# Patient Record
Sex: FEMALE | Race: BLACK OR AFRICAN AMERICAN | NOT HISPANIC OR LATINO | URBAN - METROPOLITAN AREA
[De-identification: names, ages, dates, MRNs, and addresses within clinical notes are randomized per-mention and may not be internally consistent; named-entity substitution may affect disease eponyms.]

---

## 2018-02-09 ENCOUNTER — OUTPATIENT (OUTPATIENT)
Dept: OUTPATIENT SERVICES | Facility: HOSPITAL | Age: 53
LOS: 1 days | Discharge: HOME | End: 2018-02-09

## 2018-02-12 DIAGNOSIS — G47.33 OBSTRUCTIVE SLEEP APNEA (ADULT) (PEDIATRIC): ICD-10-CM

## 2019-10-25 ENCOUNTER — INPATIENT (INPATIENT)
Facility: HOSPITAL | Age: 54
LOS: 0 days | Discharge: HOME | End: 2019-10-26
Attending: INTERNAL MEDICINE | Admitting: INTERNAL MEDICINE
Payer: COMMERCIAL

## 2019-10-25 VITALS
DIASTOLIC BLOOD PRESSURE: 76 MMHG | RESPIRATION RATE: 20 BRPM | HEART RATE: 112 BPM | TEMPERATURE: 96 F | SYSTOLIC BLOOD PRESSURE: 132 MMHG | OXYGEN SATURATION: 100 %

## 2019-10-25 LAB
ALBUMIN SERPL ELPH-MCNC: 3.8 G/DL — SIGNIFICANT CHANGE UP (ref 3.5–5.2)
ALP SERPL-CCNC: 95 U/L — SIGNIFICANT CHANGE UP (ref 30–115)
ALT FLD-CCNC: 26 U/L — SIGNIFICANT CHANGE UP (ref 0–41)
ANION GAP SERPL CALC-SCNC: 16 MMOL/L — HIGH (ref 7–14)
AST SERPL-CCNC: 33 U/L — SIGNIFICANT CHANGE UP (ref 0–41)
BASOPHILS # BLD AUTO: 0.01 K/UL — SIGNIFICANT CHANGE UP (ref 0–0.2)
BASOPHILS NFR BLD AUTO: 0.1 % — SIGNIFICANT CHANGE UP (ref 0–1)
BILIRUB SERPL-MCNC: 0.2 MG/DL — SIGNIFICANT CHANGE UP (ref 0.2–1.2)
BUN SERPL-MCNC: 20 MG/DL — SIGNIFICANT CHANGE UP (ref 10–20)
CALCIUM SERPL-MCNC: 9.1 MG/DL — SIGNIFICANT CHANGE UP (ref 8.5–10.1)
CHLORIDE SERPL-SCNC: 103 MMOL/L — SIGNIFICANT CHANGE UP (ref 98–110)
CO2 SERPL-SCNC: 20 MMOL/L — SIGNIFICANT CHANGE UP (ref 17–32)
CREAT SERPL-MCNC: 0.8 MG/DL — SIGNIFICANT CHANGE UP (ref 0.7–1.5)
EOSINOPHIL # BLD AUTO: 0.05 K/UL — SIGNIFICANT CHANGE UP (ref 0–0.7)
EOSINOPHIL NFR BLD AUTO: 0.7 % — SIGNIFICANT CHANGE UP (ref 0–8)
GLUCOSE SERPL-MCNC: 176 MG/DL — HIGH (ref 70–99)
HCT VFR BLD CALC: 44.1 % — SIGNIFICANT CHANGE UP (ref 37–47)
HGB BLD-MCNC: 14.1 G/DL — SIGNIFICANT CHANGE UP (ref 12–16)
IMM GRANULOCYTES NFR BLD AUTO: 0.7 % — HIGH (ref 0.1–0.3)
LYMPHOCYTES # BLD AUTO: 2.26 K/UL — SIGNIFICANT CHANGE UP (ref 1.2–3.4)
LYMPHOCYTES # BLD AUTO: 31 % — SIGNIFICANT CHANGE UP (ref 20.5–51.1)
MCHC RBC-ENTMCNC: 29.4 PG — SIGNIFICANT CHANGE UP (ref 27–31)
MCHC RBC-ENTMCNC: 32 G/DL — SIGNIFICANT CHANGE UP (ref 32–37)
MCV RBC AUTO: 92.1 FL — SIGNIFICANT CHANGE UP (ref 81–99)
MONOCYTES # BLD AUTO: 0.25 K/UL — SIGNIFICANT CHANGE UP (ref 0.1–0.6)
MONOCYTES NFR BLD AUTO: 3.4 % — SIGNIFICANT CHANGE UP (ref 1.7–9.3)
NEUTROPHILS # BLD AUTO: 4.66 K/UL — SIGNIFICANT CHANGE UP (ref 1.4–6.5)
NEUTROPHILS NFR BLD AUTO: 64.1 % — SIGNIFICANT CHANGE UP (ref 42.2–75.2)
NRBC # BLD: 0 /100 WBCS — SIGNIFICANT CHANGE UP (ref 0–0)
PLATELET # BLD AUTO: 298 K/UL — SIGNIFICANT CHANGE UP (ref 130–400)
POTASSIUM SERPL-MCNC: 5.5 MMOL/L — HIGH (ref 3.5–5)
POTASSIUM SERPL-SCNC: 5.5 MMOL/L — HIGH (ref 3.5–5)
PROT SERPL-MCNC: 6.7 G/DL — SIGNIFICANT CHANGE UP (ref 6–8)
RBC # BLD: 4.79 M/UL — SIGNIFICANT CHANGE UP (ref 4.2–5.4)
RBC # FLD: 14.5 % — SIGNIFICANT CHANGE UP (ref 11.5–14.5)
SODIUM SERPL-SCNC: 139 MMOL/L — SIGNIFICANT CHANGE UP (ref 135–146)
WBC # BLD: 7.28 K/UL — SIGNIFICANT CHANGE UP (ref 4.8–10.8)
WBC # FLD AUTO: 7.28 K/UL — SIGNIFICANT CHANGE UP (ref 4.8–10.8)

## 2019-10-25 RX ORDER — ALBUTEROL 90 UG/1
2 AEROSOL, METERED ORAL EVERY 6 HOURS
Refills: 0 | Status: DISCONTINUED | OUTPATIENT
Start: 2019-10-25 | End: 2019-10-26

## 2019-10-25 RX ORDER — FAMOTIDINE 10 MG/ML
20 INJECTION INTRAVENOUS ONCE
Refills: 0 | Status: COMPLETED | OUTPATIENT
Start: 2019-10-25 | End: 2019-10-25

## 2019-10-25 RX ORDER — FAMOTIDINE 10 MG/ML
20 INJECTION INTRAVENOUS
Refills: 0 | Status: DISCONTINUED | OUTPATIENT
Start: 2019-10-25 | End: 2019-10-26

## 2019-10-25 RX ORDER — DIPHENHYDRAMINE HCL 50 MG
50 CAPSULE ORAL ONCE
Refills: 0 | Status: COMPLETED | OUTPATIENT
Start: 2019-10-25 | End: 2019-10-25

## 2019-10-25 RX ORDER — DIPHENHYDRAMINE HCL 50 MG
25 CAPSULE ORAL EVERY 6 HOURS
Refills: 0 | Status: DISCONTINUED | OUTPATIENT
Start: 2019-10-25 | End: 2019-10-26

## 2019-10-25 RX ORDER — SODIUM CHLORIDE 9 MG/ML
1000 INJECTION, SOLUTION INTRAVENOUS ONCE
Refills: 0 | Status: COMPLETED | OUTPATIENT
Start: 2019-10-25 | End: 2019-10-25

## 2019-10-25 RX ORDER — ALPRAZOLAM 0.25 MG
0.5 TABLET ORAL ONCE
Refills: 0 | Status: DISCONTINUED | OUTPATIENT
Start: 2019-10-25 | End: 2019-10-26

## 2019-10-25 RX ORDER — EPINEPHRINE 0.3 MG/.3ML
0.3 INJECTION INTRAMUSCULAR; SUBCUTANEOUS ONCE
Refills: 0 | Status: DISCONTINUED | OUTPATIENT
Start: 2019-10-25 | End: 2019-10-26

## 2019-10-25 RX ORDER — EPINEPHRINE 0.3 MG/.3ML
0.3 INJECTION INTRAMUSCULAR; SUBCUTANEOUS ONCE
Refills: 0 | Status: COMPLETED | OUTPATIENT
Start: 2019-10-25 | End: 2019-10-25

## 2019-10-25 RX ORDER — BUDESONIDE AND FORMOTEROL FUMARATE DIHYDRATE 160; 4.5 UG/1; UG/1
2 AEROSOL RESPIRATORY (INHALATION)
Refills: 0 | Status: DISCONTINUED | OUTPATIENT
Start: 2019-10-25 | End: 2019-10-26

## 2019-10-25 RX ORDER — DEXAMETHASONE 0.5 MG/5ML
6 ELIXIR ORAL EVERY 8 HOURS
Refills: 0 | Status: COMPLETED | OUTPATIENT
Start: 2019-10-25 | End: 2019-10-26

## 2019-10-25 RX ADMIN — EPINEPHRINE 0.3 MILLIGRAM(S): 0.3 INJECTION INTRAMUSCULAR; SUBCUTANEOUS at 18:05

## 2019-10-25 RX ADMIN — Medication 50 MILLIGRAM(S): at 18:04

## 2019-10-25 RX ADMIN — SODIUM CHLORIDE 1000 MILLILITER(S): 9 INJECTION, SOLUTION INTRAVENOUS at 18:05

## 2019-10-25 RX ADMIN — Medication 125 MILLIGRAM(S): at 18:05

## 2019-10-25 RX ADMIN — SODIUM CHLORIDE 1000 MILLILITER(S): 9 INJECTION, SOLUTION INTRAVENOUS at 19:00

## 2019-10-25 RX ADMIN — FAMOTIDINE 20 MILLIGRAM(S): 10 INJECTION INTRAVENOUS at 18:05

## 2019-10-25 NOTE — ED ADULT NURSE NOTE - OBJECTIVE STATEMENT
patient states she ate a candy bar with peanuts about 1330. about 1530 patient started to feel chest pain, sob, nausea/vomiting, and her neck started swelling. she gave herself her epipen and came to ED. as per , patient's neck swelling has improved

## 2019-10-25 NOTE — H&P ADULT - NSHPPHYSICALEXAM_GEN_ALL_CORE
PHYSICAL EXAM:  GENERAL: No acute distress, well-developed  HEAD:  Atraumatic, Normocephalic  EYES: EOMI, PERRLA, conjunctiva and sclera clear  NECK: Supple, no lymphadenopathy, no JVD  CHEST/LUNG: CTAB; No wheezes, rales, or rhonchi  HEART: Regular rate and rhythm; No murmurs, rubs, or gallops  ABDOMEN: Soft, non-tender, non-distended; normal bowel sounds, no organomegaly  EXTREMITIES:  2+ peripheral pulses b/l, No clubbing, cyanosis, or edema  NEUROLOGY: A&O x 3, no focal deficits  SKIN: No rashes or lesions

## 2019-10-25 NOTE — ED PROVIDER NOTE - PHYSICAL EXAMINATION
Constitutional: Well developed, well nourished. NAD. Good general hygiene.  Head: Atraumatic.  Eyes: PERRLA. EOMI without discomfort.   ENT: No nasal discharge. Mucous membranes moist. No tonsillar erythema, edema. No edema of tongue or lips.  Neck: Supple. Painless ROM.  Cardiovascular: Sinus tachy. Normal S1 and S2. No murmurs. 2+ pulses in all extremities.   Pulmonary: Normal respiratory rate and effort. Lungs clear to auscultation bilaterally. No wheezing, rales, or rhonchi. Bilateral, equal lung expansion.   Abdominal: Soft. Nondistended. Nontender. No rebound or guarding.   Extremities. Pelvis stable. No lower extremity edema. Symmetric calves.  Skin: No rashes.   Neuro: AAOx3. No focal neurological deficits.

## 2019-10-25 NOTE — ED ADULT NURSE NOTE - NSIMPLEMENTINTERV_GEN_ALL_ED
Implemented All Universal Safety Interventions:  [place] to call system. Call bell, personal items and telephone within reach. Instruct patient to call for assistance. Room bathroom lighting operational. Non-slip footwear when patient is off stretcher. Physically safe environment: no spills, clutter or unnecessary equipment. Stretcher in lowest position, wheels locked, appropriate side rails in place.

## 2019-10-25 NOTE — H&P ADULT - NSHPLABSRESULTS_GEN_ALL_CORE
14.1   7.28  )-----------( 298      ( 25 Oct 2019 17:30 )             44.1       10-25    139  |  103  |  20  ----------------------------<  176<H>  5.5<H>   |  20  |  0.8    Ca    9.1      25 Oct 2019 17:30    TPro  6.7  /  Alb  3.8  /  TBili  0.2  /  DBili  x   /  AST  33  /  ALT  26  /  AlkPhos  95  10-25      LIVER FUNCTIONS - ( 25 Oct 2019 17:30 )  Alb: 3.8 g/dL / Pro: 6.7 g/dL / ALK PHOS: 95 U/L / ALT: 26 U/L / AST: 33 U/L / GGT: x

## 2019-10-25 NOTE — ED PROVIDER NOTE - ATTENDING CONTRIBUTION TO CARE
54 year old female pmhx of allergies to peanuts, come sin s/p peanut exposure, + sensation of throat closure, + hives, no cp, + SOB, no n/v/d. patient given epi prior to arrival, patient required second epi in the ED    CONSTITUTIONAL: Well-developed; well-nourished; in no acute distress. Sitting up and providing appropriate history and physical examination  SKIN: skin exam is warm and dry, no acute rash.  HEAD: Normocephalic; atraumatic.  EYES: PERRL, 3 mm bilateral, no nystagmus, EOM intact; conjunctiva and sclera clear.  ENT: No uvular edema, no pooling of secretions, No nasal discharge; airway clear.  NECK: Supple; non tender. + full passive ROM in all directions. No JVD  CARD: S1, S2 normal; no murmurs, gallops, or rubs. Regular rate and rhythm. + Symmetric Strong Pulses  RESP: No wheezes, rales or rhonchi. Good air movement bilaterally  ABD: soft; non-distended; non-tender. No Rebound, No Guarding, No signs of peritonitis, No CVA tenderness. No pulsatile abdominal mass. + Strong and Symmetric Pulses  EXT: Normal ROM. No clubbing, cyanosis or edema. Dp and Pt Pulses intact. Cap refill less than 3 seconds  NEURO: Alert, oriented, grossly unremarkable. No Focal deficits. GCS 15. NIH 0  PSYCH: Cooperative, appropriate.

## 2019-10-25 NOTE — CONSULT NOTE ADULT - ASSESSMENT
53 y/o female with anaphlyactic reaction to peanuts. - S/p administration of epi, steroids and benadryl - stable  - Cont O2 monitoring  - HOB elevated  - PO trial of liquids  - Cont steroids x24 hrs  - Recall ENT prn

## 2019-10-25 NOTE — ED ADULT TRIAGE NOTE - CHIEF COMPLAINT QUOTE
pt biba for peanut allergic rxn after having a candy bar at 13:30, had a rxn at 15:30. pt c/o diff breathing , chest pain. pt gave herself the epipen.  pt still with c/o dioff breathing. ( hx of asthma)

## 2019-10-25 NOTE — CONSULT NOTE ADULT - SUBJECTIVE AND OBJECTIVE BOX
ENT DAILY PROGRESS NOTE    Overnight events/Interval HPI: HPI:  53 y/o female with known hx of peanut allergy presented to ED with SOB/difficulty breathing and pruritus following accidental ingestion of peanuts. Pt reports incident occurred at 13:30 and she initially was ok but developed pruritus an hour later. No action was taken until around #;30pm where she started noticing throat itchiness and odynophagia. Reports sensation of throat closing up and that is when she self administered IM epi. In ED she was administered epi, steroids and benadryl Patient appears comfortable, without difficulty breathing/SOB, only c/o scratchy sensation in throat.     FFL: No airway edema, mild fullness to lingual tonsils, airway open, VC intact and moving well b/l, no VC edema/erythema.          Allergies    Drug Allergies Not Recorded  peanuts (Vomiting; Faint; Nausea; Urticaria; Short breath; Headache)    Intolerances        MEDICATIONS:  Antiinfectives:     IV fluids:    Hematologic/Anticoagulation:    Pain medications/Neuro:    Endocrine Medications:     All other standing medications:     All other PRN medications:      Vital Signs Last 24 Hrs  T(C): 37 (25 Oct 2019 17:45), Max: 37 (25 Oct 2019 17:45)  T(F): 98.6 (25 Oct 2019 17:45), Max: 98.6 (25 Oct 2019 17:45)  HR: 97 (25 Oct 2019 17:45) (97 - 112)  BP: 140/87 (25 Oct 2019 17:45) (132/76 - 140/87)  BP(mean): --  RR: 18 (25 Oct 2019 17:45) (18 - 20)  SpO2: 97% (25 Oct 2019 17:45) (97% - 100%)          PHYSICAL EXAM:    ENT EXAM-   Constitutional: Well-developed, well-nourished.  No hoarseness.     Head:  normocephalic, atraumatic.   OC/OP:  Floor of mouth, buccal mucosa, lips, hard palate, soft palate, uvula, posterior pharyngeal wall normal.  Mucosa moist.  Neck:  Trachea midline.  Thyroid, parotid and submandibular glands normal.  Lymph:  No cervical adenopathy.      LABS:  CBC-                        14.1   7.28  )-----------( 298      ( 25 Oct 2019 17:30 )             44.1     BMP/CMP-        Coagulation Studies-    Endocrine Panel-

## 2019-10-25 NOTE — H&P ADULT - ASSESSMENT
55 y/o female with hx of DM2 admitted for anaphylactic reaction to peanuts. - S/p administration of epi, steroids and benadryl - admitted for observation,  now complaining of throat scratchy sensation.   FFL showed FFL: No airway edema, mild fullness to lingual tonsils, airway open, VC intact and moving well b/l, no VC edema/erythema.    - O2 monitoring   - HOB elevated  - PO trial of liquids  - Cont steroids x24 hrs  - Continue benadryl x24 hrs   - Continue famotidine x 24 hrs  - Epi as needed     Diabetes mellitus type 2 55 y/o female with hx of DM2 admitted for anaphylactic reaction to peanuts. - S/p administration of epi, steroids and benadryl - admitted for observation,  now complaining of throat scratchy sensation.   FFL showed FFL: No airway edema, mild fullness to lingual tonsils, airway open, VC intact and moving well b/l, no VC edema/erythema.    Anaphylaxis to peanuts   - O2 monitoring   - HOB elevated  - PO trial of liquids  - Cont steroids x24 hrs  - Continue benadryl x24 hrs   - Continue famotidine x 24 hrs  - Epi as needed     Prediabetes   fu am sugar   start insulin if >180    sarcoidosis   outpatient mngt     asthma resume home meds     from home

## 2019-10-25 NOTE — ED PROVIDER NOTE - CLINICAL SUMMARY MEDICAL DECISION MAKING FREE TEXT BOX
I personally evaluated the patient. I reviewed the Resident’s or Physician Assistant’s note (as assigned above), and agree with the findings and plan except as documented in my note. patient evaluated for anaphylaxis, patient admitted for observation, required second epinephrine in the ED for refractory anaphylaxis, much improvement

## 2019-10-25 NOTE — H&P ADULT - HISTORY OF PRESENT ILLNESS
53 y/o female with known hx of DM and of peanut allergy presented to ED with SOB/difficulty breathing and pruritus following accidental ingestion of peanuts. Pt reports incident occurred at 13:30 and she initially was ok but developed pruritus an hour later. Patient then she started noticing throat itchiness and odynophagia. She Reported sensation of throat closing up and that is when she self administered IM epi. In ED she was administered epi, steroids and benadryl   then symptoms resolved except for scratchy sensation in throat - ENT evaluated patient and did   FFL: No airway edema, mild fullness to lingual tonsils, airway open, VC intact and moving well b/l, no VC edema/erythema.  being admitted for observation 53 y/o female with known hx of pre-DM , asthma , sarcoidosis, anxiety and of peanut allergy presented to ED with SOB/difficulty breathing and pruritus following accidental ingestion of peanuts. Pt reports incident occurred at 13:30 and she initially was ok but developed pruritus an hour later. Patient then she started noticing throat itchiness and odynophagia. She Reported sensation of throat closing up and that is when she self administered IM epi. In ED she was administered epi, steroids and benadryl   then symptoms resolved except for scratchy sensation in throat - ENT evaluated patient and did   FFL: No airway edema, mild fullness to lingual tonsils, airway open, VC intact and moving well b/l, no VC edema/erythema.  being admitted for observation

## 2019-10-25 NOTE — ED PROVIDER NOTE - PROGRESS NOTE DETAILS
Scoped by ENT, no edema of cords. Pt still has sore throat , otherwise pt improved after 1 epi and steroids, benadryl, pepcid, fluids. Labs normal.

## 2019-10-25 NOTE — ED PROVIDER NOTE - NS ED ROS FT
General: No fever, chills, or weakness.  Eyes:  No visual changes, eye pain or discharge.  ENMT:  Throat itching. No nose pain.   Cardiac:  No chest pain, SOB or edema. No chest pain with exertion.  Respiratory:  SOB. No cough.   GI:  No nausea, vomiting, diarrhea or abdominal pain.  :  No dysuria, frequency or burning.  MS:  No myalgia, muscle weakness, joint pain or back pain.  Neuro:  No headache.  No LOC. No change in ambulation. No dizziness.  Skin:  No skin rash.

## 2019-10-26 ENCOUNTER — TRANSCRIPTION ENCOUNTER (OUTPATIENT)
Age: 54
End: 2019-10-26

## 2019-10-26 VITALS
RESPIRATION RATE: 18 BRPM | HEART RATE: 98 BPM | TEMPERATURE: 97 F | SYSTOLIC BLOOD PRESSURE: 135 MMHG | DIASTOLIC BLOOD PRESSURE: 66 MMHG

## 2019-10-26 LAB
ANION GAP SERPL CALC-SCNC: 13 MMOL/L — SIGNIFICANT CHANGE UP (ref 7–14)
BUN SERPL-MCNC: 18 MG/DL — SIGNIFICANT CHANGE UP (ref 10–20)
CALCIUM SERPL-MCNC: 9.3 MG/DL — SIGNIFICANT CHANGE UP (ref 8.5–10.1)
CHLORIDE SERPL-SCNC: 102 MMOL/L — SIGNIFICANT CHANGE UP (ref 98–110)
CO2 SERPL-SCNC: 23 MMOL/L — SIGNIFICANT CHANGE UP (ref 17–32)
CREAT SERPL-MCNC: 0.7 MG/DL — SIGNIFICANT CHANGE UP (ref 0.7–1.5)
GLUCOSE SERPL-MCNC: 181 MG/DL — HIGH (ref 70–99)
HCT VFR BLD CALC: 40.1 % — SIGNIFICANT CHANGE UP (ref 37–47)
HCV AB S/CO SERPL IA: 0.08 S/CO — SIGNIFICANT CHANGE UP (ref 0–0.99)
HCV AB SERPL-IMP: SIGNIFICANT CHANGE UP
HGB BLD-MCNC: 12.7 G/DL — SIGNIFICANT CHANGE UP (ref 12–16)
MAGNESIUM SERPL-MCNC: 2 MG/DL — SIGNIFICANT CHANGE UP (ref 1.8–2.4)
MCHC RBC-ENTMCNC: 29.1 PG — SIGNIFICANT CHANGE UP (ref 27–31)
MCHC RBC-ENTMCNC: 31.7 G/DL — LOW (ref 32–37)
MCV RBC AUTO: 91.8 FL — SIGNIFICANT CHANGE UP (ref 81–99)
NRBC # BLD: 0 /100 WBCS — SIGNIFICANT CHANGE UP (ref 0–0)
PLATELET # BLD AUTO: 266 K/UL — SIGNIFICANT CHANGE UP (ref 130–400)
POTASSIUM SERPL-MCNC: 4.2 MMOL/L — SIGNIFICANT CHANGE UP (ref 3.5–5)
POTASSIUM SERPL-SCNC: 4.2 MMOL/L — SIGNIFICANT CHANGE UP (ref 3.5–5)
RBC # BLD: 4.37 M/UL — SIGNIFICANT CHANGE UP (ref 4.2–5.4)
RBC # FLD: 14.6 % — HIGH (ref 11.5–14.5)
SODIUM SERPL-SCNC: 138 MMOL/L — SIGNIFICANT CHANGE UP (ref 135–146)
WBC # BLD: 19.51 K/UL — HIGH (ref 4.8–10.8)
WBC # FLD AUTO: 19.51 K/UL — HIGH (ref 4.8–10.8)

## 2019-10-26 RX ORDER — INFLUENZA VIRUS VACCINE 15; 15; 15; 15 UG/.5ML; UG/.5ML; UG/.5ML; UG/.5ML
0.5 SUSPENSION INTRAMUSCULAR ONCE
Refills: 0 | Status: DISCONTINUED | OUTPATIENT
Start: 2019-10-26 | End: 2019-10-26

## 2019-10-26 RX ORDER — ESCITALOPRAM OXALATE 10 MG/1
1 TABLET, FILM COATED ORAL
Qty: 0 | Refills: 0 | DISCHARGE

## 2019-10-26 RX ORDER — EPINEPHRINE 0.3 MG/.3ML
0.3 INJECTION INTRAMUSCULAR; SUBCUTANEOUS
Qty: 0.3 | Refills: 0
Start: 2019-10-26 | End: 2019-10-26

## 2019-10-26 RX ORDER — EPINEPHRINE 0.3 MG/.3ML
0.3 INJECTION INTRAMUSCULAR; SUBCUTANEOUS
Qty: 0 | Refills: 0 | DISCHARGE
Start: 2019-10-26

## 2019-10-26 RX ADMIN — Medication 6 MILLIGRAM(S): at 06:22

## 2019-10-26 RX ADMIN — Medication 0.5 MILLIGRAM(S): at 00:16

## 2019-10-26 RX ADMIN — ALBUTEROL 2 PUFF(S): 90 AEROSOL, METERED ORAL at 09:15

## 2019-10-26 RX ADMIN — Medication 6 MILLIGRAM(S): at 00:03

## 2019-10-26 RX ADMIN — ALBUTEROL 2 PUFF(S): 90 AEROSOL, METERED ORAL at 13:50

## 2019-10-26 RX ADMIN — Medication 25 MILLIGRAM(S): at 12:25

## 2019-10-26 RX ADMIN — FAMOTIDINE 104 MILLIGRAM(S): 10 INJECTION INTRAVENOUS at 06:23

## 2019-10-26 RX ADMIN — Medication 25 MILLIGRAM(S): at 00:04

## 2019-10-26 RX ADMIN — BUDESONIDE AND FORMOTEROL FUMARATE DIHYDRATE 2 PUFF(S): 160; 4.5 AEROSOL RESPIRATORY (INHALATION) at 08:18

## 2019-10-26 RX ADMIN — FAMOTIDINE 104 MILLIGRAM(S): 10 INJECTION INTRAVENOUS at 00:03

## 2019-10-26 NOTE — DISCHARGE NOTE NURSING/CASE MANAGEMENT/SOCIAL WORK - PATIENT PORTAL LINK FT
You can access the FollowMyHealth Patient Portal offered by [facility] by registering at the following website: [email or web address]. By joining [facility]’s FollowMyHealth portal, you will also be able to view your health information using other applications (apps) compatible with our system.

## 2019-10-26 NOTE — DISCHARGE NOTE PROVIDER - HOSPITAL COURSE
55 y/o female with known hx of pre-DM , asthma , sarcoidosis, anxiety and of peanut allergy presented to ED with SOB/difficulty breathing and pruritus following accidental ingestion of peanuts. Pt reports incident occurred at 13:30 and she initially was ok but developed pruritus an hour later. Patient then she started noticing throat itchiness and odynophagia. She Reported sensation of throat closing up and that is when she self administered IM epi. In ED she was administered epi, steroids and benadryl     then symptoms resolved except for scratchy sensation in throat - ENT evaluated patient and did     FFL: No airway edema, mild fullness to lingual tonsils, airway open, VC intact and moving well b/l, no VC edema/erythema. 55 y/o female with known hx of pre-DM , asthma , sarcoidosis, anxiety and of peanut allergy presented to ED with SOB/difficulty breathing and pruritus following accidental ingestion of peanuts. Pt reports incident occurred at 13:30 and she initially was ok but developed pruritus an hour later. Patient then she started noticing throat itchiness and odynophagia. She Reported sensation of throat closing up and that is when she self administered IM epi. In ED she was administered epi, steroids and benadryl then symptoms resolved except for scratchy sensation in throat -     ENT evaluated patient and did FFL: No airway edema, mild fullness to lingual tonsils, airway open, VC intact and moving well b/l, no VC edema/erythema.    Today the patient denied having any SOB and is agreeable to discharge home.

## 2019-10-26 NOTE — DISCHARGE NOTE PROVIDER - CARE PROVIDER_API CALL
Rod Enrique)  Critical Care Medicine; Internal Medicine; Pulmonary Disease; Sleep Medicine  [address] [place] [address]  Phone: [phone]  Fax: [phone]  Follow Up Time: Rod Enrique)  Critical Care Medicine; Internal Medicine; Pulmonary Disease; Sleep Medicine  [address] [place] [address]  Phone: [phone]  Fax: [phone]  Follow Up Time:     Chai Pineda)  Otolaryngology  Head and Neck Surgery Surgery  [address] [facility], [address] [place] [id number]  Phone: [phone]  Fax: [phone]  Follow Up Time:

## 2019-10-26 NOTE — PROGRESS NOTE ADULT - SUBJECTIVE AND OBJECTIVE BOX
SUBJECTIVE:    Patient is a 54y old Female who presents with a chief complaint of allergic reaction (26 Oct 2019 09:45)    Currently admitted to medicine with the primary diagnosis of Allergic reaction     Today is hospital day 1d. This morning she is resting comfortably in bed and reports no new issues or overnight events.     PAST MEDICAL & SURGICAL HISTORY  Prediabetes  Sarcoidosis  Asthma    SOCIAL HISTORY:  Negative for smoking/alcohol/drug use.     ALLERGIES:  dust (Unknown)  peanuts (Vomiting; Faint; Nausea; Urticaria; Short breath; Headache)  penicillins (Anaphylaxis)    MEDICATIONS:  STANDING MEDICATIONS  ALBUTerol    90 MICROgram(s) HFA Inhaler 2 Puff(s) Inhalation every 6 hours  budesonide  80 MICROgram(s)/formoterol 4.5 MICROgram(s) Inhaler 2 Puff(s) Inhalation two times a day  dexAMETHasone  Injectable 6 milliGRAM(s) IV Push every 8 hours  diphenhydrAMINE   Injectable 25 milliGRAM(s) IV Push every 6 hours  famotidine  IVPB 20 milliGRAM(s) IV Intermittent two times a day  influenza   Vaccine 0.5 milliLiter(s) IntraMuscular once    PRN MEDICATIONS  EPINEPHrine     1 mG/mL Injectable 0.3 milliGRAM(s) IntraMuscular once PRN    VITALS:   T(F): 96.6  HR: 76  BP: 140/69  RR: 18  SpO2: 99%    LABS:                        14.1   7.28  )-----------( 298      ( 25 Oct 2019 17:30 )             44.1     10-25    139  |  103  |  20  ----------------------------<  176<H>  5.5<H>   |  20  |  0.8    Ca    9.1      25 Oct 2019 17:30    TPro  6.7  /  Alb  3.8  /  TBili  0.2  /  DBili  x   /  AST  33  /  ALT  26  /  AlkPhos  95  10-25                  RADIOLOGY:    PHYSICAL EXAM:  GEN: No acute distress  LUNGS: Clear to auscultation bilaterally   HEART: Regular  ABD: Soft, non-tender, non-distended.  EXT: NC/NC/NE/2+PP/BALDWIN/Skin Intact.   NEURO: AAOX3    Intravenous access:   NG tube:   Man Catheter:

## 2019-10-26 NOTE — PROGRESS NOTE ADULT - ASSESSMENT
53 y/o female with known hx of pre-DM , asthma , sarcoidosis, anxiety and of peanut allergy presented to ED with SOB/difficulty breathing and pruritus following accidental ingestion of peanuts. Pt reports incident occurred at 13:30 and she initially was ok but developed pruritus an hour later. Patient then she started noticing throat itchiness and odynophagia. She Reported sensation of throat closing up and that is when she self administered IM epi. In ED she was administered epi, steroids and benadryl   then symptoms resolved except for scratchy sensation in throat - ENT evaluated patient and did   FFL: No airway edema, mild fullness to lingual tonsils, airway open, VC intact and moving well b/l, no VC edema/erythema.  being admitted for observation (25 Oct 2019 21:21)      anaphylaxin reaction to peanut    discharge today   spoke w resident and patient     out pt allergy follow up     ENT cleared

## 2019-10-26 NOTE — DISCHARGE NOTE PROVIDER - NSDCCPCAREPLAN_GEN_ALL_CORE_FT
PRINCIPAL DISCHARGE DIAGNOSIS  Diagnosis: Allergic reaction  Assessment and Plan of Treatment: please abstain from consuming peanut products; follow-up with your primary medical doctor for further evaluation PRINCIPAL DISCHARGE DIAGNOSIS  Diagnosis: Allergic reaction  Assessment and Plan of Treatment: please abstain from consuming peanut products; follow-up with your primary medical doctor for further evaluation      SECONDARY DISCHARGE DIAGNOSES  Diagnosis: Sarcoidosis  Assessment and Plan of Treatment: please follow up with your pulmonary doctor for further evaluation

## 2019-10-30 DIAGNOSIS — J45.909 UNSPECIFIED ASTHMA, UNCOMPLICATED: ICD-10-CM

## 2019-10-30 DIAGNOSIS — G47.33 OBSTRUCTIVE SLEEP APNEA (ADULT) (PEDIATRIC): ICD-10-CM

## 2019-10-30 DIAGNOSIS — D86.9 SARCOIDOSIS, UNSPECIFIED: ICD-10-CM

## 2019-10-30 DIAGNOSIS — T78.01XA ANAPHYLACTIC REACTION DUE TO PEANUTS, INITIAL ENCOUNTER: ICD-10-CM

## 2019-10-30 DIAGNOSIS — Z91.010 ALLERGY TO PEANUTS: ICD-10-CM
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
## 2019-10-30 DIAGNOSIS — L29.8 OTHER PRURITUS: ICD-10-CM

## 2019-10-30 DIAGNOSIS — F41.9 ANXIETY DISORDER, UNSPECIFIED: ICD-10-CM

## 2019-10-30 DIAGNOSIS — E11.9 TYPE 2 DIABETES MELLITUS WITHOUT COMPLICATIONS: ICD-10-CM

## 2020-01-02 ENCOUNTER — INPATIENT (INPATIENT)
Facility: HOSPITAL | Age: 55
LOS: 7 days | Discharge: HOME | End: 2020-01-10
Attending: INTERNAL MEDICINE | Admitting: INTERNAL MEDICINE
Payer: COMMERCIAL

## 2020-01-02 VITALS
RESPIRATION RATE: 18 BRPM | TEMPERATURE: 98 F | HEART RATE: 95 BPM | SYSTOLIC BLOOD PRESSURE: 169 MMHG | OXYGEN SATURATION: 96 % | DIASTOLIC BLOOD PRESSURE: 82 MMHG | WEIGHT: 274.92 LBS

## 2020-01-02 PROBLEM — R73.03 PREDIABETES: Chronic | Status: ACTIVE | Noted: 2019-10-25

## 2020-01-02 PROBLEM — J45.909 UNSPECIFIED ASTHMA, UNCOMPLICATED: Chronic | Status: ACTIVE | Noted: 2019-10-25

## 2020-01-02 PROBLEM — D86.9 SARCOIDOSIS, UNSPECIFIED: Chronic | Status: ACTIVE | Noted: 2019-10-25

## 2020-01-02 LAB
ALBUMIN SERPL ELPH-MCNC: 4.2 G/DL — SIGNIFICANT CHANGE UP (ref 3.5–5.2)
ALP SERPL-CCNC: 103 U/L — SIGNIFICANT CHANGE UP (ref 30–115)
ALT FLD-CCNC: 35 U/L — SIGNIFICANT CHANGE UP (ref 0–41)
ANION GAP SERPL CALC-SCNC: 17 MMOL/L — HIGH (ref 7–14)
AST SERPL-CCNC: 42 U/L — HIGH (ref 0–41)
BASOPHILS # BLD AUTO: 0.03 K/UL — SIGNIFICANT CHANGE UP (ref 0–0.2)
BASOPHILS NFR BLD AUTO: 0.3 % — SIGNIFICANT CHANGE UP (ref 0–1)
BILIRUB SERPL-MCNC: 0.2 MG/DL — SIGNIFICANT CHANGE UP (ref 0.2–1.2)
BUN SERPL-MCNC: 24 MG/DL — HIGH (ref 10–20)
CALCIUM SERPL-MCNC: 9.5 MG/DL — SIGNIFICANT CHANGE UP (ref 8.5–10.1)
CHLORIDE SERPL-SCNC: 98 MMOL/L — SIGNIFICANT CHANGE UP (ref 98–110)
CO2 SERPL-SCNC: 24 MMOL/L — SIGNIFICANT CHANGE UP (ref 17–32)
CREAT SERPL-MCNC: 0.8 MG/DL — SIGNIFICANT CHANGE UP (ref 0.7–1.5)
EOSINOPHIL # BLD AUTO: 0 K/UL — SIGNIFICANT CHANGE UP (ref 0–0.7)
EOSINOPHIL NFR BLD AUTO: 0 % — SIGNIFICANT CHANGE UP (ref 0–8)
FLU A RESULT: NEGATIVE — SIGNIFICANT CHANGE UP
FLU A RESULT: NEGATIVE — SIGNIFICANT CHANGE UP
FLUAV AG NPH QL: NEGATIVE — SIGNIFICANT CHANGE UP
FLUBV AG NPH QL: NEGATIVE — SIGNIFICANT CHANGE UP
GLUCOSE SERPL-MCNC: 95 MG/DL — SIGNIFICANT CHANGE UP (ref 70–99)
HCT VFR BLD CALC: 43.3 % — SIGNIFICANT CHANGE UP (ref 37–47)
HGB BLD-MCNC: 13.6 G/DL — SIGNIFICANT CHANGE UP (ref 12–16)
IMM GRANULOCYTES NFR BLD AUTO: 0.6 % — HIGH (ref 0.1–0.3)
LACTATE SERPL-SCNC: 1.1 MMOL/L — SIGNIFICANT CHANGE UP (ref 0.7–2)
LYMPHOCYTES # BLD AUTO: 1.96 K/UL — SIGNIFICANT CHANGE UP (ref 1.2–3.4)
LYMPHOCYTES # BLD AUTO: 21.2 % — SIGNIFICANT CHANGE UP (ref 20.5–51.1)
MCHC RBC-ENTMCNC: 28.8 PG — SIGNIFICANT CHANGE UP (ref 27–31)
MCHC RBC-ENTMCNC: 31.4 G/DL — LOW (ref 32–37)
MCV RBC AUTO: 91.5 FL — SIGNIFICANT CHANGE UP (ref 81–99)
MONOCYTES # BLD AUTO: 0.83 K/UL — HIGH (ref 0.1–0.6)
MONOCYTES NFR BLD AUTO: 9 % — SIGNIFICANT CHANGE UP (ref 1.7–9.3)
NEUTROPHILS # BLD AUTO: 6.38 K/UL — SIGNIFICANT CHANGE UP (ref 1.4–6.5)
NEUTROPHILS NFR BLD AUTO: 68.9 % — SIGNIFICANT CHANGE UP (ref 42.2–75.2)
NRBC # BLD: 0 /100 WBCS — SIGNIFICANT CHANGE UP (ref 0–0)
NT-PROBNP SERPL-SCNC: 120 PG/ML — SIGNIFICANT CHANGE UP (ref 0–300)
PLATELET # BLD AUTO: 259 K/UL — SIGNIFICANT CHANGE UP (ref 130–400)
POTASSIUM SERPL-MCNC: 4.5 MMOL/L — SIGNIFICANT CHANGE UP (ref 3.5–5)
POTASSIUM SERPL-SCNC: 4.5 MMOL/L — SIGNIFICANT CHANGE UP (ref 3.5–5)
PROT SERPL-MCNC: 7.2 G/DL — SIGNIFICANT CHANGE UP (ref 6–8)
RBC # BLD: 4.73 M/UL — SIGNIFICANT CHANGE UP (ref 4.2–5.4)
RBC # FLD: 14.6 % — HIGH (ref 11.5–14.5)
RSV RESULT: POSITIVE
RSV RNA RESP QL NAA+PROBE: POSITIVE
SODIUM SERPL-SCNC: 139 MMOL/L — SIGNIFICANT CHANGE UP (ref 135–146)
TROPONIN T SERPL-MCNC: <0.01 NG/ML — SIGNIFICANT CHANGE UP
WBC # BLD: 9.26 K/UL — SIGNIFICANT CHANGE UP (ref 4.8–10.8)
WBC # FLD AUTO: 9.26 K/UL — SIGNIFICANT CHANGE UP (ref 4.8–10.8)

## 2020-01-02 RX ORDER — CHLORHEXIDINE GLUCONATE 213 G/1000ML
1 SOLUTION TOPICAL
Refills: 0 | Status: DISCONTINUED | OUTPATIENT
Start: 2020-01-02 | End: 2020-01-10

## 2020-01-02 RX ORDER — TIOTROPIUM BROMIDE 18 UG/1
1 CAPSULE ORAL; RESPIRATORY (INHALATION) DAILY
Refills: 0 | Status: DISCONTINUED | OUTPATIENT
Start: 2020-01-02 | End: 2020-01-10

## 2020-01-02 RX ORDER — IPRATROPIUM/ALBUTEROL SULFATE 18-103MCG
3 AEROSOL WITH ADAPTER (GRAM) INHALATION EVERY 6 HOURS
Refills: 0 | Status: DISCONTINUED | OUTPATIENT
Start: 2020-01-02 | End: 2020-01-10

## 2020-01-02 RX ORDER — FUROSEMIDE 40 MG
1 TABLET ORAL
Qty: 0 | Refills: 0 | DISCHARGE

## 2020-01-02 RX ORDER — ALBUTEROL 90 UG/1
1 AEROSOL, METERED ORAL EVERY 4 HOURS
Refills: 0 | Status: DISCONTINUED | OUTPATIENT
Start: 2020-01-02 | End: 2020-01-10

## 2020-01-02 RX ORDER — IPRATROPIUM/ALBUTEROL SULFATE 18-103MCG
3 AEROSOL WITH ADAPTER (GRAM) INHALATION ONCE
Refills: 0 | Status: COMPLETED | OUTPATIENT
Start: 2020-01-02 | End: 2020-01-02

## 2020-01-02 RX ORDER — ESCITALOPRAM OXALATE 10 MG/1
10 TABLET, FILM COATED ORAL DAILY
Refills: 0 | Status: DISCONTINUED | OUTPATIENT
Start: 2020-01-02 | End: 2020-01-10

## 2020-01-02 RX ORDER — BUDESONIDE AND FORMOTEROL FUMARATE DIHYDRATE 160; 4.5 UG/1; UG/1
2 AEROSOL RESPIRATORY (INHALATION)
Refills: 0 | Status: DISCONTINUED | OUTPATIENT
Start: 2020-01-02 | End: 2020-01-03

## 2020-01-02 RX ORDER — ENOXAPARIN SODIUM 100 MG/ML
40 INJECTION SUBCUTANEOUS DAILY
Refills: 0 | Status: DISCONTINUED | OUTPATIENT
Start: 2020-01-02 | End: 2020-01-10

## 2020-01-02 RX ORDER — MAGNESIUM SULFATE 500 MG/ML
2 VIAL (ML) INJECTION ONCE
Refills: 0 | Status: COMPLETED | OUTPATIENT
Start: 2020-01-02 | End: 2020-01-02

## 2020-01-02 RX ORDER — IPRATROPIUM/ALBUTEROL SULFATE 18-103MCG
3 AEROSOL WITH ADAPTER (GRAM) INHALATION
Refills: 0 | Status: COMPLETED | OUTPATIENT
Start: 2020-01-02 | End: 2020-01-02

## 2020-01-02 RX ADMIN — Medication 3 MILLILITER(S): at 15:00

## 2020-01-02 RX ADMIN — Medication 100 GRAM(S): at 14:20

## 2020-01-02 RX ADMIN — Medication 125 MILLIGRAM(S): at 14:19

## 2020-01-02 RX ADMIN — Medication 3 MILLILITER(S): at 14:40

## 2020-01-02 RX ADMIN — Medication 3 MILLILITER(S): at 14:20

## 2020-01-02 RX ADMIN — BUDESONIDE AND FORMOTEROL FUMARATE DIHYDRATE 2 PUFF(S): 160; 4.5 AEROSOL RESPIRATORY (INHALATION) at 21:58

## 2020-01-02 RX ADMIN — Medication 3 MILLILITER(S): at 14:30

## 2020-01-02 RX ADMIN — Medication 3 MILLILITER(S): at 14:50

## 2020-01-02 NOTE — ED ADULT TRIAGE NOTE - CHIEF COMPLAINT QUOTE
pt coming in with chest pain, cough, sob. was placed on abx and steriod by her PMD. still not feeling well

## 2020-01-02 NOTE — ED PROVIDER NOTE - PHYSICAL EXAMINATION
CON: ao x 3, HENMT: clear oropharynx,  neck supple,  CV: rrr, equal pulses b/l, RESP: decrease air entrly bl with wheezing bl mild tachypnea GI:  soft, nontender, no rebound, no guarding, SKIN: no rash, MSK: no deformities, NEURO: no gross motor or sensory deficit Psychiatric: appropriate mood, appropriate affect

## 2020-01-02 NOTE — H&P ADULT - NSHPLABSRESULTS_GEN_ALL_CORE
13.6   9.26  )-----------( 259      ( 02 Jan 2020 14:30 )             43.3       01-02    139  |  98  |  24<H>  ----------------------------<  95  4.5   |  24  |  0.8    Ca    9.5      02 Jan 2020 14:30    TPro  7.2  /  Alb  4.2  /  TBili  0.2  /  DBili  x   /  AST  42<H>  /  ALT  35  /  AlkPhos  103  01-02                      Lactate Trend  01-02 @ 14:30 Lactate:1.1       CARDIAC MARKERS ( 02 Jan 2020 14:30 )  x     / <0.01 ng/mL / x     / x     / x            CAPILLARY BLOOD GLUCOSE

## 2020-01-02 NOTE — H&P ADULT - NSHPPHYSICALEXAM_GEN_ALL_CORE
GENERAL: NAD, coughing   HEAD:  Atraumatic, Normocephalic  EYES: EOMI, PERRLA, conjunctiva and sclera clear  ENT: Moist mucous membranes  CHEST/LUNG: mild wheezing bilateral  HEART: Regular rate and rhythm;   ABDOMEN: ; Soft, Nontender, Nondistended. No hepatomegally  EXTREMITIES:  2+ Peripheral Pulses, brisk capillary refill. No clubbing, cyanosis, or edema  NERVOUS SYSTEM:  Alert & Oriented X3, speech clear. No deficits   MSK: FROM all 4 extremities, full and equal strength  SKIN: No rashes or lesions

## 2020-01-02 NOTE — H&P ADULT - ASSESSMENT
54  year old female with history of sarcoidosis, asthma, anxiety, CHARO, pre DM presents with SOB for 4 days duration.    # Acute Asthma exacerbation   # H/O Sarcoidosis  # H/O CHARO noncompliant with CPAP.   - s/p 125 mg solumedrol 5* AMNA/ ipratropium and MG in ED, with improvement.   - c/w AMNA Q4   - c/w solumedrol 60 mg  QD, switch to prednisone 50 mg tomorrow if continues to improve   - RVP swab   - CXR looks clear to me, s/p one dose of Levaquin in ED, f/u official report, no leukocytosis, no fever.   - Dr Heath pulmonary eval     # H/O Anxiety c/w home meds       # DASH diet   # DVT PPX LOVENOX  # FULL CODE 54  year old female with history of sarcoidosis, asthma, anxiety, CHARO, pre DM presents with SOB for 4 days duration.    # Acute Asthma exacerbation   # H/O Sarcoidosis  # H/O CHARO noncompliant with CPAP.   - please f/u peak flow   - s/p 125 mg solumedrol 5* AMNA/ ipratropium and MG in ED, with improvement.   - c/w AMNA Q4   - c/w solumedrol 60 mg  QD, switch to prednisone 50 mg tomorrow if continues to improve   - RVP swab   - CXR looks clear to me, s/p one dose of Levaquin in ED, f/u official report, no leukocytosis, no fever.   - Dr Heath pulmonary eval     # H/O Anxiety c/w home meds       # DASH diet   # DVT PPX LOVENOX  # FULL CODE 54  year old female with history of sarcoidosis, asthma, anxiety, CHARO, pre DM presents with SOB for 4 days duration.    # Acute Asthma exacerbation   # H/O Sarcoidosis  # H/O CHARO noncompliant with CPAP.   - please f/u peak flow   - s/p 125 mg solumedrol 5* AMNA/ ipratropium and MG in ED, with improvement.   - c/w AMNA Q4   - c/w solumedrol 60 mg  QD, switch to prednisone 50 mg tomorrow if continues to improve   - RVP swab   - CXR looks clear to me, s/p one dose of Levaquin in ED, f/u official report, no leukocytosis, no fever.   - CPAP at night      # H/O Anxiety c/w home meds   # DASH diet   # DVT PPX LOVENOX  # FULL CODE 54  year old female with history of sarcoidosis, asthma, anxiety, CHARO, pre DM presents with SOB for 4 days duration.    # Acute Asthma exacerbation secondary to RSV bronchitis  # H/O Sarcoidosis  # H/O CHARO noncompliant with CPAP.   - please f/u peak flow   - s/p 125 mg solumedrol 5* AMNA/ ipratropium and MG in ED, with improvement.   - c/w AMNA Q4   - c/w solumedrol 60 mg  QD, switch to prednisone 50 mg tomorrow if continues to improve   - IVF  - CXR looks clear to me, s/p one dose of Levaquin in ED, f/u official report, no leukocytosis, no fever.   - CPAP at night      # H/O Anxiety c/w home meds   # DASH diet   # DVT PPX LOVENOX  # FULL CODE

## 2020-01-02 NOTE — H&P ADULT - ATTENDING COMMENTS
54  year old female with history of sarcoidosis, asthma, anxiety, CHARO, pre DM admitted with acute asthma exacerbation secondary to RSV bronchitis    Pt seen and examined- feeling a bit better    spoke with  at bedside    Chart reviewed- agree with above    IV steroids    empiric abx- can be PO    O2 as needed    bronchodilators    pulm jodie martinez DC when cleared by pulm- hopefully in 24 hrs

## 2020-01-02 NOTE — ED PROVIDER NOTE - PROGRESS NOTE DETAILS
on re-eval pt with persistent bilateral wheezing, sob. sx's have improved but not fully. flu swab not currently available due to lack of swab

## 2020-01-02 NOTE — ED PROVIDER NOTE - NS ED ROS FT
Review of Systems    Constitutional: (-) fever  Eyes/ENT: (-) blurry vision, (-) epistaxis  Cardiovascular: see hpi  Respiratory: see hpi  Gastrointestinal: (-) vomiting, (-) diarrhea  Musculoskeletal: (-) neck pain, (-) back pain, (-) joint pain  Integumentary: (-) rash, (-) edema  Neurological: (-) headache, (-) altered mental status  Psychiatric: (-) hallucinations  Allergic/Immunologic: (-) pruritus  All other systems are negative except as mentioned above

## 2020-01-02 NOTE — ED PROVIDER NOTE - CLINICAL SUMMARY MEDICAL DECISION MAKING FREE TEXT BOX
pt here with worsening sob, cough after being on steroids and doxy by pmd. sx's not improved after neb, magnesium

## 2020-01-02 NOTE — ED PROVIDER NOTE - OBJECTIVE STATEMENT
54  yr old female here for eval of sob. pt reports since saturday Dec 28th, pt has not been feeling well. pt initially with post nasal drip that has progressed to wheezing and nonproductive cough. pt went to pmd on tueday dec 31st, given doxy and steroids. pt but reports not improved. Specifically coughing, wheezing, cant bring up phlemb. + chest pain when coughing. 54  yr old female  hx of sarcoid, asthma, here for eval of sob. pt reports since saturday Dec 28th, pt has not been feeling well. pt initially with post nasal drip that has progressed to wheezing and nonproductive cough. pt went to pmd on tueday dec 31st, given doxy and steroids. pt but reports not improved. Specifically coughing, wheezing, cant bring up phlemb. + chest pain when coughing.

## 2020-01-02 NOTE — ED ADULT NURSE NOTE - ED STAT RN HANDOFF DETAILS
endorsed to rn tone, iv intact, pt alert and oriented, speaking coherently, steady gait, o2 sat 98% room air. Report provided to MARY Rodriguez

## 2020-01-02 NOTE — H&P ADULT - HISTORY OF PRESENT ILLNESS
54  year old female with history of sarcoidosis, asthma, presents with SOB for 4 days duration.   patient was doing well until 4 days prior to presentation when symptoms started as post nasal drip that has progressed to wheezing and nonproductive cough.     Went to her PMD 2 days prior to presentation who started her on Doxy and steroid with no improvement.     In ED temp 98.2, hr 95, bp 169/82, rr 18 O2 sat 96 no leukocytosis, in respiratory distress, S/P 125 mg methylprednisolone nebs and MG, showed significant improvement . received one dose of Levaquin in ED for questionable opacity in the RLL. 54  year old female with history of sarcoidosis, asthma, anxiety, CHARO, pre DM presents with SOB for 4 days duration.   patient was doing well until 4 days prior to presentation when symptoms started as post nasal drip that has progressed to wheezing and nonproductive cough. has attacks of cough associated with chest pain and severe SOB   Went to her PMD 2 days prior to presentation who started her on Doxy and prednisone 50 mg which sh received for 2 days with no improvement. Today she complains of runny nose, low appetite, denies chest pain nausea, vomiting, abd pain, or diarrhea.  In ED temp 98.2, hr 95, bp 169/82, rr 18 O2 sat 96 no leukocytosis, in respiratory distress, S/P 125 mg methylprednisolone nebs and MG, showed significant improvement . received one dose of Levaquin in ED for questionable opacity in the RLL.   admitted for asthma exacerbation

## 2020-01-03 LAB
ALBUMIN SERPL ELPH-MCNC: 4.3 G/DL — SIGNIFICANT CHANGE UP (ref 3.5–5.2)
ALP SERPL-CCNC: 107 U/L — SIGNIFICANT CHANGE UP (ref 30–115)
ALT FLD-CCNC: 36 U/L — SIGNIFICANT CHANGE UP (ref 0–41)
ANION GAP SERPL CALC-SCNC: 17 MMOL/L — HIGH (ref 7–14)
AST SERPL-CCNC: 38 U/L — SIGNIFICANT CHANGE UP (ref 0–41)
BASOPHILS # BLD AUTO: 0.02 K/UL — SIGNIFICANT CHANGE UP (ref 0–0.2)
BASOPHILS NFR BLD AUTO: 0.2 % — SIGNIFICANT CHANGE UP (ref 0–1)
BILIRUB SERPL-MCNC: 0.3 MG/DL — SIGNIFICANT CHANGE UP (ref 0.2–1.2)
BUN SERPL-MCNC: 24 MG/DL — HIGH (ref 10–20)
CALCIUM SERPL-MCNC: 9.7 MG/DL — SIGNIFICANT CHANGE UP (ref 8.5–10.1)
CHLORIDE SERPL-SCNC: 97 MMOL/L — LOW (ref 98–110)
CO2 SERPL-SCNC: 25 MMOL/L — SIGNIFICANT CHANGE UP (ref 17–32)
CREAT SERPL-MCNC: 0.9 MG/DL — SIGNIFICANT CHANGE UP (ref 0.7–1.5)
EOSINOPHIL # BLD AUTO: 0.01 K/UL — SIGNIFICANT CHANGE UP (ref 0–0.7)
EOSINOPHIL NFR BLD AUTO: 0.1 % — SIGNIFICANT CHANGE UP (ref 0–8)
GLUCOSE BLDC GLUCOMTR-MCNC: 168 MG/DL — HIGH (ref 70–99)
GLUCOSE SERPL-MCNC: 112 MG/DL — HIGH (ref 70–99)
HCT VFR BLD CALC: 43.8 % — SIGNIFICANT CHANGE UP (ref 37–47)
HGB BLD-MCNC: 14 G/DL — SIGNIFICANT CHANGE UP (ref 12–16)
IMM GRANULOCYTES NFR BLD AUTO: 0.8 % — HIGH (ref 0.1–0.3)
LACTATE SERPL-SCNC: 2.4 MMOL/L — HIGH (ref 0.7–2)
LYMPHOCYTES # BLD AUTO: 2.31 K/UL — SIGNIFICANT CHANGE UP (ref 1.2–3.4)
LYMPHOCYTES # BLD AUTO: 22.6 % — SIGNIFICANT CHANGE UP (ref 20.5–51.1)
MAGNESIUM SERPL-MCNC: 2.3 MG/DL — SIGNIFICANT CHANGE UP (ref 1.8–2.4)
MCHC RBC-ENTMCNC: 29 PG — SIGNIFICANT CHANGE UP (ref 27–31)
MCHC RBC-ENTMCNC: 32 G/DL — SIGNIFICANT CHANGE UP (ref 32–37)
MCV RBC AUTO: 90.9 FL — SIGNIFICANT CHANGE UP (ref 81–99)
MONOCYTES # BLD AUTO: 1.01 K/UL — HIGH (ref 0.1–0.6)
MONOCYTES NFR BLD AUTO: 9.9 % — HIGH (ref 1.7–9.3)
NEUTROPHILS # BLD AUTO: 6.79 K/UL — HIGH (ref 1.4–6.5)
NEUTROPHILS NFR BLD AUTO: 66.4 % — SIGNIFICANT CHANGE UP (ref 42.2–75.2)
NRBC # BLD: 0 /100 WBCS — SIGNIFICANT CHANGE UP (ref 0–0)
PLATELET # BLD AUTO: 299 K/UL — SIGNIFICANT CHANGE UP (ref 130–400)
POTASSIUM SERPL-MCNC: 4.8 MMOL/L — SIGNIFICANT CHANGE UP (ref 3.5–5)
POTASSIUM SERPL-SCNC: 4.8 MMOL/L — SIGNIFICANT CHANGE UP (ref 3.5–5)
PROT SERPL-MCNC: 7.4 G/DL — SIGNIFICANT CHANGE UP (ref 6–8)
RBC # BLD: 4.82 M/UL — SIGNIFICANT CHANGE UP (ref 4.2–5.4)
RBC # FLD: 14.8 % — HIGH (ref 11.5–14.5)
SODIUM SERPL-SCNC: 139 MMOL/L — SIGNIFICANT CHANGE UP (ref 135–146)
WBC # BLD: 10.22 K/UL — SIGNIFICANT CHANGE UP (ref 4.8–10.8)
WBC # FLD AUTO: 10.22 K/UL — SIGNIFICANT CHANGE UP (ref 4.8–10.8)

## 2020-01-03 RX ORDER — AMLODIPINE BESYLATE 2.5 MG/1
5 TABLET ORAL DAILY
Refills: 0 | Status: DISCONTINUED | OUTPATIENT
Start: 2020-01-03 | End: 2020-01-10

## 2020-01-03 RX ORDER — LANOLIN ALCOHOL/MO/W.PET/CERES
3 CREAM (GRAM) TOPICAL AT BEDTIME
Refills: 0 | Status: DISCONTINUED | OUTPATIENT
Start: 2020-01-03 | End: 2020-01-08

## 2020-01-03 RX ORDER — BUDESONIDE AND FORMOTEROL FUMARATE DIHYDRATE 160; 4.5 UG/1; UG/1
2 AEROSOL RESPIRATORY (INHALATION)
Refills: 0 | Status: DISCONTINUED | OUTPATIENT
Start: 2020-01-03 | End: 2020-01-10

## 2020-01-03 RX ORDER — GUAIFENESIN/DEXTROMETHORPHAN 600MG-30MG
10 TABLET, EXTENDED RELEASE 12 HR ORAL EVERY 4 HOURS
Refills: 0 | Status: DISCONTINUED | OUTPATIENT
Start: 2020-01-03 | End: 2020-01-10

## 2020-01-03 RX ADMIN — Medication 10 MILLILITER(S): at 17:15

## 2020-01-03 RX ADMIN — Medication 10 MILLILITER(S): at 21:40

## 2020-01-03 RX ADMIN — Medication 3 MILLILITER(S): at 08:26

## 2020-01-03 RX ADMIN — Medication 3 MILLIGRAM(S): at 21:43

## 2020-01-03 RX ADMIN — BUDESONIDE AND FORMOTEROL FUMARATE DIHYDRATE 2 PUFF(S): 160; 4.5 AEROSOL RESPIRATORY (INHALATION) at 08:18

## 2020-01-03 RX ADMIN — Medication 10 MILLILITER(S): at 12:35

## 2020-01-03 RX ADMIN — Medication 10 MILLILITER(S): at 06:30

## 2020-01-03 RX ADMIN — Medication 40 MILLIGRAM(S): at 12:36

## 2020-01-03 RX ADMIN — Medication 40 MILLIGRAM(S): at 05:47

## 2020-01-03 RX ADMIN — ENOXAPARIN SODIUM 40 MILLIGRAM(S): 100 INJECTION SUBCUTANEOUS at 12:35

## 2020-01-03 RX ADMIN — Medication 3 MILLILITER(S): at 13:53

## 2020-01-03 RX ADMIN — ESCITALOPRAM OXALATE 10 MILLIGRAM(S): 10 TABLET, FILM COATED ORAL at 12:36

## 2020-01-03 RX ADMIN — AMLODIPINE BESYLATE 5 MILLIGRAM(S): 2.5 TABLET ORAL at 12:34

## 2020-01-03 RX ADMIN — Medication 3 MILLILITER(S): at 20:29

## 2020-01-03 NOTE — PROGRESS NOTE ADULT - SUBJECTIVE AND OBJECTIVE BOX
SUBJECTIVE:    Patient is a 54y old Female who presents with a chief complaint of SOB (03 Jan 2020 09:17)    Currently admitted to medicine with the primary diagnosis of Shortness of breath     Today is hospital day 1d. This morning she is resting comfortably in bed and reports no new issues or overnight events.     PAST MEDICAL & SURGICAL HISTORY  Prediabetes  Sarcoidosis  Asthma    SOCIAL HISTORY:  Negative for smoking/alcohol/drug use.     ALLERGIES:  dust (Unknown)  peanuts (Vomiting; Faint; Nausea; Urticaria; Short breath; Headache)  penicillins (Anaphylaxis)    MEDICATIONS:  STANDING MEDICATIONS  ALBUTerol    90 MICROgram(s) HFA Inhaler 1 Puff(s) Inhalation every 4 hours  albuterol/ipratropium for Nebulization 3 milliLiter(s) Nebulizer every 6 hours  amLODIPine   Tablet 5 milliGRAM(s) Oral daily  budesonide  80 MICROgram(s)/formoterol 4.5 MICROgram(s) Inhaler 2 Puff(s) Inhalation two times a day  chlorhexidine 4% Liquid 1 Application(s) Topical <User Schedule>  enoxaparin Injectable 40 milliGRAM(s) SubCutaneous daily  escitalopram 10 milliGRAM(s) Oral daily  guaifenesin/dextromethorphan  Syrup 10 milliLiter(s) Oral every 4 hours  melatonin 3 milliGRAM(s) Oral at bedtime  predniSONE   Tablet 40 milliGRAM(s) Oral daily  tiotropium 18 MICROgram(s) Capsule 1 Capsule(s) Inhalation daily    PRN MEDICATIONS    VITALS:   T(F): 98.5  HR: 84  BP: 171/83  RR: 20  SpO2: 96%    LABS:                        14.0   10.22 )-----------( 299      ( 03 Jan 2020 06:27 )             43.8     01-03    139  |  97<L>  |  24<H>  ----------------------------<  112<H>  4.8   |  25  |  0.9    Ca    9.7      03 Jan 2020 06:27  Mg     2.3     01-03    TPro  7.4  /  Alb  4.3  /  TBili  0.3  /  DBili  x   /  AST  38  /  ALT  36  /  AlkPhos  107  01-03          Lactate, Blood: 2.4 mmol/L <H> (01-03-20 @ 06:27)  Troponin T, Serum: <0.01 ng/mL (01-02-20 @ 14:30)  Lactate, Blood: 1.1 mmol/L (01-02-20 @ 14:30)      CARDIAC MARKERS ( 02 Jan 2020 14:30 )  x     / <0.01 ng/mL / x     / x     / x          RADIOLOGY:  no official read yet.  PHYSICAL EXAM:  GEN: No acute distress  LUNGS: mild wheezing b/l  HEART: S1/S2 present. RRR.   ABD: Soft, non-tender, non-distended. Bowel sounds present  EXT: NC/NC/NE/2+PP/BALDWIN  NEURO: AAOX3

## 2020-01-03 NOTE — PROGRESS NOTE ADULT - ASSESSMENT
54  year old female with history of sarcoidosis, asthma, anxiety, CHARO, pre DM presents with SOB for 4 days duration.    # Acute Asthma exacerbation secondary to RSV bronchitis  # H/O Sarcoidosis  # H/O CHARO noncompliant with CPAP.  - CXR clear, RSV +, no fever or leukocytosis  - flu negative   - please f/u peak flow   - s/p 125 mg solumedrol 5* AMNA/ ipratropium and MG in ED, with improvement.   - c/w AMNA Q4   - c/w solumedrol 60 mg  QD, switch to prednisone 50 mg tomorrow if continues to improve   - IVF  - CXR looks clear to me, s/p one dose of Levaquin in ED, f/u official report, no leukocytosis, no fever.   - CPAP at night      # H/O Anxiety c/w home meds   # DASH diet   # DVT PPX LOVENOX  # FULL CODE 54  year old female with history of sarcoidosis, asthma, anxiety, CHARO, pre DM presents with SOB for 4 days duration.    # Acute Asthma exacerbation secondary to RSV bronchitis  # H/O Sarcoidosis  - CXR clear, RSV +, no fever or leukocytosis  - flu negative   - s/p one dose of Levaquin in ED, f/u official report, no leukocytosis, no fever.   - s/p 125 mg solumedrol 5* AMNA/ ipratropium and MG in ED, with improvement. in ED  - c/w AMNA Q4 and prednisone 40 mg PO q24    # H/O CHARO noncompliant with CPAP.- use CPAP at night  # H/O Anxiety c/w home meds     # DASH diet   # DVT PPX LOVENOX  # DISPO acute  # FULL CODE 54  year old female with history of sarcoidosis, asthma, anxiety, CHARO, pre DM presents with SOB for 4 days duration.    # Acute Asthma exacerbation secondary to RSV bronchitis  # H/O Sarcoidosis  - CXR clear, RSV +, no fever or leukocytosis  - flu negative   - s/p one dose of Levaquin in ED, f/u official report, no leukocytosis, no fever.   - s/p 125 mg solumedrol 5* AMNA/ ipratropium and MG in ED, with improvement. in ED  - c/w AMNA Q4 and prednisone 40 mg PO q24    # HTN- not on any home medications, if BP remains persistently elevated, will start amlodipine 5mg q24  # H/O CHARO noncompliant with CPAP.- use CPAP at night  # H/O Anxiety c/w home meds     # DASH diet   # DVT PPX LOVENOX  # DISPO acute  # FULL CODE 54  year old female with history of sarcoidosis, asthma, anxiety, CHARO, pre DM presents with SOB for 4 days duration.    # Acute Asthma exacerbation secondary to RSV bronchitis  # H/O Sarcoidosis  - CXR clear, RSV +, no fever or leukocytosis  - flu negative   - s/p one dose of Levaquin in ED,   - s/p 125 mg solumedrol 5* AMNA/ ipratropium and MG in ED, with improvement. in ED  - c/w AMNA Q4 and prednisone 40 mg PO q24, Symbicort 160mg 2puff q12  - f/u pulm, possible d/c today    # HTN- not on any home medications, if BP remains persistently elevated, will start amlodipine 5mg q24  # H/O CHARO noncompliant with CPAP.- use CPAP at night  # H/O Anxiety c/w home meds     # DASH diet   # DVT PPX LOVENOX  # DISPO acute  # FULL CODE

## 2020-01-03 NOTE — CONSULT NOTE ADULT - SUBJECTIVE AND OBJECTIVE BOX
Patient is a 54y old  Female who presents with a chief complaint of SOB (03 Jan 2020 09:17)      HPI:  54  year old female with history of sarcoidosis(since 2008 in remission), asthma, anxiety, CHARO, pre DM presents with SOB for 4 days duration(since monday).   patient was doing well until 4 days prior to presentation when symptoms started as post nasal drip that has progressed to wheezing and nonproductive cough. has attacks of cough associated with chest pain and severe SOB   Went to her PMD 2 days prior to presentation who started her on Doxy and prednisone 50 mg which she received for 2 days with no improvement. Today she complains of runny nose, low appetite, denies chest pain nausea, vomiting, abd pain, or diarrhea. patient reported the use of all her remaining proair(20x in the past 4 days),in addition to nebs. no sick contacts, no pet.  In ED temp 98.2, hr 95, bp 169/82, rr 18 O2 sat 96 no leukocytosis, in respiratory distress, S/P 125 mg methylprednisolone nebs and MG, showed significant improvement . received one dose of Levaquin in ED for questionable opacity in the RLL.   admitted for asthma exacerbation (02 Jan 2020 17:55)      PAST MEDICAL & SURGICAL HISTORY:  Prediabetes  Sarcoidosis  Asthma      SOCIAL HX:   Smoking  1ppd for 15 years, active smoker(quit at the age of 28 restarted smoking 3 years ago)                       ETOH                            Other    FAMILY HISTORY:  .  No cardiovascular or pulmonary family history     Review of System:  See HPI    Allergies    dust (Unknown)  peanuts (Vomiting; Faint; Nausea; Urticaria; Short breath; Headache)  penicillins (Anaphylaxis)    Intolerances          PHYSICAL EXAM  Vital Signs Last 24 Hrs  T(C): 36.9 (03 Jan 2020 04:44), Max: 37.1 (02 Jan 2020 15:36)  T(F): 98.5 (03 Jan 2020 04:44), Max: 98.7 (02 Jan 2020 15:36)  HR: 84 (03 Jan 2020 04:44) (82 - 105)  BP: 171/83 (03 Jan 2020 04:44) (168/70 - 185/93)  BP(mean): --  RR: 20 (03 Jan 2020 04:44) (18 - 20)  SpO2: 96% on RA (02 Jan 2020 20:47) (93% - 98%)    General: In NAD  HEENT: WILBER             Lymphatic system: No cervical LN     Lungs: Decreased BS at the bases, no wheezing  Cardiovascular: Regular, no LE edema  Gastrointestinal: Soft.  + BS   Musculoskeletal: No Clubbing.  Full range of motion.. Moves all extremities  Skin: Warm.  Intact  Neurological: No motor or sensory deficit       LABS:                          14.0   10.22 )-----------( 299      ( 03 Jan 2020 06:27 )             43.8                                               01-03    139  |  97<L>  |  24<H>  ----------------------------<  112<H>  4.8   |  25  |  0.9    Ca    9.7      03 Jan 2020 06:27  Mg     2.3     01-03    TPro  7.4  /  Alb  4.3  /  TBili  0.3  /  DBili  x   /  AST  38  /  ALT  36  /  AlkPhos  107  01-03                                                 CARDIAC MARKERS ( 02 Jan 2020 14:30 )  x     / <0.01 ng/mL / x     / x     / x                                                LIVER FUNCTIONS - ( 03 Jan 2020 06:27 )  Alb: 4.3 g/dL / Pro: 7.4 g/dL / ALK PHOS: 107 U/L / ALT: 36 U/L / AST: 38 U/L / GGT: x                                                                                                MEDICATIONS  (STANDING):  ALBUTerol    90 MICROgram(s) HFA Inhaler 1 Puff(s) Inhalation every 4 hours  albuterol/ipratropium for Nebulization 3 milliLiter(s) Nebulizer every 6 hours  amLODIPine   Tablet 5 milliGRAM(s) Oral daily  budesonide  80 MICROgram(s)/formoterol 4.5 MICROgram(s) Inhaler 2 Puff(s) Inhalation two times a day  chlorhexidine 4% Liquid 1 Application(s) Topical <User Schedule>  enoxaparin Injectable 40 milliGRAM(s) SubCutaneous daily  escitalopram 10 milliGRAM(s) Oral daily  guaifenesin/dextromethorphan  Syrup 10 milliLiter(s) Oral every 4 hours  melatonin 3 milliGRAM(s) Oral at bedtime  predniSONE   Tablet 40 milliGRAM(s) Oral daily  tiotropium 18 MICROgram(s) Capsule 1 Capsule(s) Inhalation daily    MEDICATIONS  (PRN):

## 2020-01-03 NOTE — PROGRESS NOTE ADULT - SUBJECTIVE AND OBJECTIVE BOX
Patient was seen and examined. Spoke with . Chart reviewed.  Vital Signs Last 24 Hrs  T(F): 98.5 (03 Jan 2020 04:44), Max: 98.7 (02 Jan 2020 15:36)  HR: 84 (03 Jan 2020 04:44) (82 - 105)  BP: 171/83 (03 Jan 2020 04:44) (168/70 - 185/93)  SpO2: 96% (02 Jan 2020 20:47) (93% - 98%)  MEDICATIONS  (STANDING):  ALBUTerol    90 MICROgram(s) HFA Inhaler 1 Puff(s) Inhalation every 4 hours  albuterol/ipratropium for Nebulization 3 milliLiter(s) Nebulizer every 6 hours  budesonide  80 MICROgram(s)/formoterol 4.5 MICROgram(s) Inhaler 2 Puff(s) Inhalation two times a day  chlorhexidine 4% Liquid 1 Application(s) Topical <User Schedule>  enoxaparin Injectable 40 milliGRAM(s) SubCutaneous daily  escitalopram 10 milliGRAM(s) Oral daily  guaifenesin/dextromethorphan  Syrup 10 milliLiter(s) Oral every 4 hours  melatonin 3 milliGRAM(s) Oral at bedtime  tiotropium 18 MICROgram(s) Capsule 1 Capsule(s) Inhalation daily    MEDICATIONS  (PRN):    Labs:                        14.0   10.22 )-----------( 299      ( 03 Jan 2020 06:27 )             43.8                         13.6   9.26  )-----------( 259      ( 02 Jan 2020 14:30 )             43.3     03 Jan 2020 06:27    139    |  97     |  24     ----------------------------<  112    4.8     |  25     |  0.9    02 Jan 2020 14:30    139    |  98     |  24     ----------------------------<  95     4.5     |  24     |  0.8      Ca    9.7        03 Jan 2020 06:27  Ca    9.5        02 Jan 2020 14:30  Mg     2.3       03 Jan 2020 06:27    TPro  7.4    /  Alb  4.3    /  TBili  0.3    /  DBili  x      /  AST  38     /  ALT  36     /  AlkPhos  107    03 Jan 2020 06:27  TPro  7.2    /  Alb  4.2    /  TBili  0.2    /  DBili  x      /  AST  42     /  ALT  35     /  AlkPhos  103    02 Jan 2020 14:30          General: comfortable, NAD  Neurology: A&Ox3, nonfocal  Head:  Normocephalic, atraumatic  ENT:  Mucosa moist, no ulcerations  Neck:  Supple, no JVD,   Skin: no breakdowns (as per RN)  Resp: CTA B/L  CV: RRR, S1S2,   GI: Soft, NT, bowel sounds  MS: No edema, + peripheral pulses, FROM all 4 extremity      A/P:  54  year old woman with history of sarcoidosis, asthma, anxiety, CHARO, pre DM admitted with acute asthma exacerbation secondary to RSV bronchitis    IV steroids    empiric abx- can be PO    O2 as needed    bronchodilators    pulm jodie martinez DC when cleared by pulm- hopefully in 24 hrs .     DVT prophylaxis  Decubitus prevention- all measures as per RN protocol  Please call or text me with any questions or updates

## 2020-01-03 NOTE — CONSULT NOTE ADULT - ASSESSMENT
impression:    Acute asthma exacerbation 2ry to RSV  h/o of sarcoidosis  CHARO on CPAP(non-compliant)    Plan:    Check peak flow, nt-probnp  Prednisone taper start in am over 10 days  increased symbicort to 160  Continue LAMA  DVT ppx  OOB to chair  Spoke to the patient regarding the importance of being compliant with CPAP machine.  Smoking cessation.

## 2020-01-04 LAB
ALBUMIN SERPL ELPH-MCNC: 3.9 G/DL — SIGNIFICANT CHANGE UP (ref 3.5–5.2)
ALP SERPL-CCNC: 91 U/L — SIGNIFICANT CHANGE UP (ref 30–115)
ALT FLD-CCNC: 52 U/L — HIGH (ref 0–41)
ANION GAP SERPL CALC-SCNC: 18 MMOL/L — HIGH (ref 7–14)
AST SERPL-CCNC: 50 U/L — HIGH (ref 0–41)
BASOPHILS # BLD AUTO: 0.02 K/UL — SIGNIFICANT CHANGE UP (ref 0–0.2)
BASOPHILS NFR BLD AUTO: 0.2 % — SIGNIFICANT CHANGE UP (ref 0–1)
BILIRUB SERPL-MCNC: 0.3 MG/DL — SIGNIFICANT CHANGE UP (ref 0.2–1.2)
BUN SERPL-MCNC: 28 MG/DL — HIGH (ref 10–20)
CALCIUM SERPL-MCNC: 9.4 MG/DL — SIGNIFICANT CHANGE UP (ref 8.5–10.1)
CHLORIDE SERPL-SCNC: 99 MMOL/L — SIGNIFICANT CHANGE UP (ref 98–110)
CO2 SERPL-SCNC: 24 MMOL/L — SIGNIFICANT CHANGE UP (ref 17–32)
CREAT SERPL-MCNC: 0.9 MG/DL — SIGNIFICANT CHANGE UP (ref 0.7–1.5)
EOSINOPHIL # BLD AUTO: 0 K/UL — SIGNIFICANT CHANGE UP (ref 0–0.7)
EOSINOPHIL NFR BLD AUTO: 0 % — SIGNIFICANT CHANGE UP (ref 0–8)
GLUCOSE SERPL-MCNC: 164 MG/DL — HIGH (ref 70–99)
HCT VFR BLD CALC: 43 % — SIGNIFICANT CHANGE UP (ref 37–47)
HGB BLD-MCNC: 13.6 G/DL — SIGNIFICANT CHANGE UP (ref 12–16)
IMM GRANULOCYTES NFR BLD AUTO: 0.6 % — HIGH (ref 0.1–0.3)
LYMPHOCYTES # BLD AUTO: 2.57 K/UL — SIGNIFICANT CHANGE UP (ref 1.2–3.4)
LYMPHOCYTES # BLD AUTO: 25.7 % — SIGNIFICANT CHANGE UP (ref 20.5–51.1)
MCHC RBC-ENTMCNC: 29 PG — SIGNIFICANT CHANGE UP (ref 27–31)
MCHC RBC-ENTMCNC: 31.6 G/DL — LOW (ref 32–37)
MCV RBC AUTO: 91.7 FL — SIGNIFICANT CHANGE UP (ref 81–99)
MONOCYTES # BLD AUTO: 0.75 K/UL — HIGH (ref 0.1–0.6)
MONOCYTES NFR BLD AUTO: 7.5 % — SIGNIFICANT CHANGE UP (ref 1.7–9.3)
NEUTROPHILS # BLD AUTO: 6.61 K/UL — HIGH (ref 1.4–6.5)
NEUTROPHILS NFR BLD AUTO: 66 % — SIGNIFICANT CHANGE UP (ref 42.2–75.2)
NRBC # BLD: 0 /100 WBCS — SIGNIFICANT CHANGE UP (ref 0–0)
PLATELET # BLD AUTO: 282 K/UL — SIGNIFICANT CHANGE UP (ref 130–400)
POTASSIUM SERPL-MCNC: 4 MMOL/L — SIGNIFICANT CHANGE UP (ref 3.5–5)
POTASSIUM SERPL-SCNC: 4 MMOL/L — SIGNIFICANT CHANGE UP (ref 3.5–5)
PROT SERPL-MCNC: 6.9 G/DL — SIGNIFICANT CHANGE UP (ref 6–8)
RBC # BLD: 4.69 M/UL — SIGNIFICANT CHANGE UP (ref 4.2–5.4)
RBC # FLD: 14.8 % — HIGH (ref 11.5–14.5)
SODIUM SERPL-SCNC: 141 MMOL/L — SIGNIFICANT CHANGE UP (ref 135–146)
WBC # BLD: 10.01 K/UL — SIGNIFICANT CHANGE UP (ref 4.8–10.8)
WBC # FLD AUTO: 10.01 K/UL — SIGNIFICANT CHANGE UP (ref 4.8–10.8)

## 2020-01-04 RX ADMIN — Medication 3 MILLIGRAM(S): at 23:54

## 2020-01-04 RX ADMIN — Medication 10 MILLILITER(S): at 13:25

## 2020-01-04 RX ADMIN — Medication 10 MILLILITER(S): at 05:31

## 2020-01-04 RX ADMIN — Medication 3 MILLILITER(S): at 08:49

## 2020-01-04 RX ADMIN — BUDESONIDE AND FORMOTEROL FUMARATE DIHYDRATE 2 PUFF(S): 160; 4.5 AEROSOL RESPIRATORY (INHALATION) at 11:59

## 2020-01-04 RX ADMIN — BUDESONIDE AND FORMOTEROL FUMARATE DIHYDRATE 2 PUFF(S): 160; 4.5 AEROSOL RESPIRATORY (INHALATION) at 22:24

## 2020-01-04 RX ADMIN — ESCITALOPRAM OXALATE 10 MILLIGRAM(S): 10 TABLET, FILM COATED ORAL at 12:00

## 2020-01-04 RX ADMIN — Medication 40 MILLIGRAM(S): at 05:31

## 2020-01-04 RX ADMIN — AMLODIPINE BESYLATE 5 MILLIGRAM(S): 2.5 TABLET ORAL at 05:31

## 2020-01-04 RX ADMIN — ENOXAPARIN SODIUM 40 MILLIGRAM(S): 100 INJECTION SUBCUTANEOUS at 12:00

## 2020-01-04 RX ADMIN — Medication 3 MILLILITER(S): at 14:00

## 2020-01-04 RX ADMIN — Medication 3 MILLILITER(S): at 21:04

## 2020-01-04 RX ADMIN — Medication 10 MILLILITER(S): at 10:03

## 2020-01-04 RX ADMIN — Medication 10 MILLILITER(S): at 17:14

## 2020-01-04 RX ADMIN — Medication 10 MILLILITER(S): at 22:22

## 2020-01-04 NOTE — PROGRESS NOTE ADULT - ASSESSMENT
acute asthma  sarcoid  damir  rsv    po steroids/po abx in am and dc home in am  continue current rx

## 2020-01-04 NOTE — PROGRESS NOTE ADULT - SUBJECTIVE AND OBJECTIVE BOX
Patient was seen and examined. Spoke with RN. Chart reviewed.  feels weak  prefers to go in am home  family at bedside  extensive discussion    No events overnight.  Vital Signs Last 24 Hrs  T(F): 98.3 (04 Jan 2020 12:06), Max: 98.3 (04 Jan 2020 12:06)  HR: 92 (04 Jan 2020 12:06) (79 - 95)  BP: 137/67 (04 Jan 2020 12:06) (137/67 - 157/70)  SpO2: 99% (04 Jan 2020 09:00) (95% - 99%)  MEDICATIONS  (STANDING):  ALBUTerol    90 MICROgram(s) HFA Inhaler 1 Puff(s) Inhalation every 4 hours  albuterol/ipratropium for Nebulization 3 milliLiter(s) Nebulizer every 6 hours  amLODIPine   Tablet 5 milliGRAM(s) Oral daily  budesonide 160 MICROgram(s)/formoterol 4.5 MICROgram(s) Inhaler 2 Puff(s) Inhalation two times a day  chlorhexidine 4% Liquid 1 Application(s) Topical <User Schedule>  enoxaparin Injectable 40 milliGRAM(s) SubCutaneous daily  escitalopram 10 milliGRAM(s) Oral daily  guaifenesin/dextromethorphan  Syrup 10 milliLiter(s) Oral every 4 hours  melatonin 3 milliGRAM(s) Oral at bedtime  predniSONE   Tablet 40 milliGRAM(s) Oral daily  tiotropium 18 MICROgram(s) Capsule 1 Capsule(s) Inhalation daily    MEDICATIONS  (PRN):    Labs:                        13.6   10.01 )-----------( 282      ( 04 Jan 2020 07:49 )             43.0                         14.0   10.22 )-----------( 299      ( 03 Jan 2020 06:27 )             43.8     04 Jan 2020 07:49    141    |  99     |  28     ----------------------------<  164    4.0     |  24     |  0.9    03 Jan 2020 06:27    139    |  97     |  24     ----------------------------<  112    4.8     |  25     |  0.9      Ca    9.4        04 Jan 2020 07:49  Ca    9.7        03 Jan 2020 06:27  Mg     2.3       03 Jan 2020 06:27    TPro  6.9    /  Alb  3.9    /  TBili  0.3    /  DBili  x      /  AST  50     /  ALT  52     /  AlkPhos  91     04 Jan 2020 07:49  TPro  7.4    /  Alb  4.3    /  TBili  0.3    /  DBili  x      /  AST  38     /  ALT  36     /  AlkPhos  107    03 Jan 2020 06:27            Radiology:    General: comfortable, NAD  Neurology: A&Ox3, nonfocal  Head:  Normocephalic, atraumatic  ENT:  Mucosa moist, no ulcerations  Neck:  Supple, no JVD,   Skin: no breakdowns (as per RN)  Resp: Coarse br  CV: RRR, S1S2,   GI: Soft, NT, bowel sounds  MS: No edema, + peripheral pulses, FROM all 4 extremity

## 2020-01-05 LAB
GLUCOSE BLDC GLUCOMTR-MCNC: 103 MG/DL — HIGH (ref 70–99)
GLUCOSE BLDC GLUCOMTR-MCNC: 116 MG/DL — HIGH (ref 70–99)

## 2020-01-05 RX ORDER — ACETAMINOPHEN 500 MG
650 TABLET ORAL ONCE
Refills: 0 | Status: COMPLETED | OUTPATIENT
Start: 2020-01-05 | End: 2020-01-05

## 2020-01-05 RX ADMIN — Medication 10 MILLILITER(S): at 21:50

## 2020-01-05 RX ADMIN — Medication 10 MILLILITER(S): at 05:54

## 2020-01-05 RX ADMIN — Medication 650 MILLIGRAM(S): at 21:49

## 2020-01-05 RX ADMIN — Medication 3 MILLILITER(S): at 08:43

## 2020-01-05 RX ADMIN — BUDESONIDE AND FORMOTEROL FUMARATE DIHYDRATE 2 PUFF(S): 160; 4.5 AEROSOL RESPIRATORY (INHALATION) at 08:03

## 2020-01-05 RX ADMIN — AMLODIPINE BESYLATE 5 MILLIGRAM(S): 2.5 TABLET ORAL at 05:55

## 2020-01-05 RX ADMIN — Medication 10 MILLILITER(S): at 09:44

## 2020-01-05 RX ADMIN — Medication 650 MILLIGRAM(S): at 00:15

## 2020-01-05 RX ADMIN — Medication 3 MILLILITER(S): at 20:08

## 2020-01-05 RX ADMIN — Medication 3 MILLIGRAM(S): at 21:51

## 2020-01-05 RX ADMIN — Medication 10 MILLILITER(S): at 17:28

## 2020-01-05 RX ADMIN — Medication 40 MILLIGRAM(S): at 05:54

## 2020-01-05 RX ADMIN — ENOXAPARIN SODIUM 40 MILLIGRAM(S): 100 INJECTION SUBCUTANEOUS at 11:53

## 2020-01-05 RX ADMIN — Medication 10 MILLILITER(S): at 14:02

## 2020-01-05 RX ADMIN — Medication 650 MILLIGRAM(S): at 22:10

## 2020-01-05 RX ADMIN — ESCITALOPRAM OXALATE 10 MILLIGRAM(S): 10 TABLET, FILM COATED ORAL at 11:52

## 2020-01-05 RX ADMIN — Medication 3 MILLILITER(S): at 12:32

## 2020-01-05 NOTE — PROGRESS NOTE ADULT - SUBJECTIVE AND OBJECTIVE BOX
Patient was seen and examined. Spoke with family at bedside. Chart reviewed.  No new events overnight- still very dyspneic with mild exertion  Vital Signs Last 24 Hrs  T(F): 96.7 (05 Jan 2020 04:55), Max: 98.3 (04 Jan 2020 12:06)  HR: 80 (05 Jan 2020 04:55) (80 - 92)  BP: 161/77 (05 Jan 2020 04:55) (137/67 - 161/77)  SpO2: --  MEDICATIONS  (STANDING):  ALBUTerol    90 MICROgram(s) HFA Inhaler 1 Puff(s) Inhalation every 4 hours  albuterol/ipratropium for Nebulization 3 milliLiter(s) Nebulizer every 6 hours  amLODIPine   Tablet 5 milliGRAM(s) Oral daily  budesonide 160 MICROgram(s)/formoterol 4.5 MICROgram(s) Inhaler 2 Puff(s) Inhalation two times a day  chlorhexidine 4% Liquid 1 Application(s) Topical <User Schedule>  enoxaparin Injectable 40 milliGRAM(s) SubCutaneous daily  escitalopram 10 milliGRAM(s) Oral daily  guaifenesin/dextromethorphan  Syrup 10 milliLiter(s) Oral every 4 hours  melatonin 3 milliGRAM(s) Oral at bedtime  predniSONE   Tablet 40 milliGRAM(s) Oral daily  tiotropium 18 MICROgram(s) Capsule 1 Capsule(s) Inhalation daily    MEDICATIONS  (PRN):    Labs:                        13.6   10.01 )-----------( 282      ( 04 Jan 2020 07:49 )             43.0     04 Jan 2020 07:49    141    |  99     |  28     ----------------------------<  164    4.0     |  24     |  0.9      Ca    9.4        04 Jan 2020 07:49    TPro  6.9    /  Alb  3.9    /  TBili  0.3    /  DBili  x      /  AST  50     /  ALT  52     /  AlkPhos  91     04 Jan 2020 07:49          Culture - Blood (collected 03 Jan 2020 06:27)  Source: .Blood None  Preliminary Report (04 Jan 2020 18:01):    No growth to date.      General: comfortable, NAD  Neurology: A&Ox3, nonfocal  Head:  Normocephalic, atraumatic  ENT:  Mucosa moist, no ulcerations  Neck:  Supple, no JVD,   Skin: no breakdowns (as per RN)  Resp: Coarse BS B/L  CV: RRR, S1S2,   GI: Soft, NT, bowel sounds, obese  MS: No edema, + peripheral pulses, FROM all 4 extremity      A/P:  54  year old woman with history of sarcoidosis, asthma, anxiety, CHAOR, pre DM admitted with acute asthma exacerbation secondary to RSV bronchitis    PO steroids    O2 as needed- document O2 sat on RA on exertion    bronchodilators    pulm f/u today as pt still very symptomatic    DC when cleared by pulm- possibly today    DVT prophylaxis  Decubitus prevention- all measures as per RN protocol  Please call or text me with any questions or updates

## 2020-01-05 NOTE — PROGRESS NOTE ADULT - ASSESSMENT
54  year old female with history of sarcoidosis, asthma, anxiety, CHARO, pre DM presents with SOB for 4 days duration.    # Acute Asthma exacerbation secondary to RSV bronchitis  # H/O Sarcoidosis  - CXR clear, RSV +, no fever or leukocytosis  - flu negative   - s/p one dose of Levaquin in ED,   - s/p 125 mg solumedrol 5* AMNA/ ipratropium and MG in ED, with improvement. in ED  - c/w AMNA Q4 and prednisone 40 mg PO q24, Symbicort 160mg 2puff q12  - f/u pulm, possible d/c today    # HTN- not on any home medications, if BP remains persistently elevated, will start amlodipine 5mg q24  # H/O CHARO noncompliant with CPAP.- use CPAP at night  # H/O Anxiety c/w home meds     # DASH diet   # DVT PPX LOVENOX  # DISPO acute  # FULL CODE

## 2020-01-05 NOTE — PROGRESS NOTE ADULT - SUBJECTIVE AND OBJECTIVE BOX
SUBJECTIVE:    Patient is a 54y old Female who presents with a chief complaint of SOB (05 Jan 2020 10:19)    Currently admitted to medicine with the primary diagnosis of Shortness of breath     Today is hospital day 3d. This morning she is resting comfortably in bed and reports no new issues or overnight events.     PAST MEDICAL & SURGICAL HISTORY  Prediabetes  Sarcoidosis  Asthma    SOCIAL HISTORY:  Negative for smoking/alcohol/drug use.     ALLERGIES:  dust (Unknown)  peanuts (Vomiting; Faint; Nausea; Urticaria; Short breath; Headache)  penicillins (Anaphylaxis)    MEDICATIONS:  STANDING MEDICATIONS  ALBUTerol    90 MICROgram(s) HFA Inhaler 1 Puff(s) Inhalation every 4 hours  albuterol/ipratropium for Nebulization 3 milliLiter(s) Nebulizer every 6 hours  amLODIPine   Tablet 5 milliGRAM(s) Oral daily  budesonide 160 MICROgram(s)/formoterol 4.5 MICROgram(s) Inhaler 2 Puff(s) Inhalation two times a day  chlorhexidine 4% Liquid 1 Application(s) Topical <User Schedule>  enoxaparin Injectable 40 milliGRAM(s) SubCutaneous daily  escitalopram 10 milliGRAM(s) Oral daily  guaifenesin/dextromethorphan  Syrup 10 milliLiter(s) Oral every 4 hours  melatonin 3 milliGRAM(s) Oral at bedtime  predniSONE   Tablet 40 milliGRAM(s) Oral daily  tiotropium 18 MICROgram(s) Capsule 1 Capsule(s) Inhalation daily    PRN MEDICATIONS    VITALS:   T(F): 96.7  HR: 80  BP: 161/77  RR: 18  SpO2: --    LABS:                        13.6   10.01 )-----------( 282      ( 04 Jan 2020 07:49 )             43.0     01-04    141  |  99  |  28<H>  ----------------------------<  164<H>  4.0   |  24  |  0.9    Ca    9.4      04 Jan 2020 07:49    TPro  6.9  /  Alb  3.9  /  TBili  0.3  /  DBili  x   /  AST  50<H>  /  ALT  52<H>  /  AlkPhos  91  01-04              Culture - Blood (collected 03 Jan 2020 06:27)  Source: .Blood None  Preliminary Report (04 Jan 2020 18:01):    No growth to date.          RADIOLOGY:  < from: Xray Chest 1 View- PORTABLE-Routine (01.04.20 @ 08:00) >  Stable bilateral interstitial opacities    < end of copied text >    PHYSICAL EXAM:  General: In NAD  HEENT: WILBER             Lymphatic system: No cervical LN     Lungs: Decreased BS at the bases, no wheezing  Cardiovascular: Regular, no LE edema  Gastrointestinal: Soft.  + BS   Musculoskeletal: No Clubbing.  Full range of motion.. Moves all extremities  Skin: Warm.  Intact  Neurological: No motor or sensory deficit 3

## 2020-01-06 LAB
ALBUMIN SERPL ELPH-MCNC: 4 G/DL — SIGNIFICANT CHANGE UP (ref 3.5–5.2)
ALP SERPL-CCNC: 95 U/L — SIGNIFICANT CHANGE UP (ref 30–115)
ALT FLD-CCNC: 109 U/L — HIGH (ref 0–41)
ANION GAP SERPL CALC-SCNC: 16 MMOL/L — HIGH (ref 7–14)
AST SERPL-CCNC: 64 U/L — HIGH (ref 0–41)
BASOPHILS # BLD AUTO: 0.05 K/UL — SIGNIFICANT CHANGE UP (ref 0–0.2)
BASOPHILS NFR BLD AUTO: 0.5 % — SIGNIFICANT CHANGE UP (ref 0–1)
BILIRUB SERPL-MCNC: 0.4 MG/DL — SIGNIFICANT CHANGE UP (ref 0.2–1.2)
BUN SERPL-MCNC: 23 MG/DL — HIGH (ref 10–20)
CALCIUM SERPL-MCNC: 9 MG/DL — SIGNIFICANT CHANGE UP (ref 8.5–10.1)
CHLORIDE SERPL-SCNC: 101 MMOL/L — SIGNIFICANT CHANGE UP (ref 98–110)
CO2 SERPL-SCNC: 26 MMOL/L — SIGNIFICANT CHANGE UP (ref 17–32)
CREAT SERPL-MCNC: 0.7 MG/DL — SIGNIFICANT CHANGE UP (ref 0.7–1.5)
EOSINOPHIL # BLD AUTO: 0.06 K/UL — SIGNIFICANT CHANGE UP (ref 0–0.7)
EOSINOPHIL NFR BLD AUTO: 0.5 % — SIGNIFICANT CHANGE UP (ref 0–8)
GLUCOSE BLDC GLUCOMTR-MCNC: 128 MG/DL — HIGH (ref 70–99)
GLUCOSE BLDC GLUCOMTR-MCNC: 135 MG/DL — HIGH (ref 70–99)
GLUCOSE BLDC GLUCOMTR-MCNC: 205 MG/DL — HIGH (ref 70–99)
GLUCOSE BLDC GLUCOMTR-MCNC: 94 MG/DL — SIGNIFICANT CHANGE UP (ref 70–99)
GLUCOSE SERPL-MCNC: 116 MG/DL — HIGH (ref 70–99)
HCT VFR BLD CALC: 41 % — SIGNIFICANT CHANGE UP (ref 37–47)
HGB BLD-MCNC: 13 G/DL — SIGNIFICANT CHANGE UP (ref 12–16)
IMM GRANULOCYTES NFR BLD AUTO: 1.3 % — HIGH (ref 0.1–0.3)
LYMPHOCYTES # BLD AUTO: 4.59 K/UL — HIGH (ref 1.2–3.4)
LYMPHOCYTES # BLD AUTO: 41.4 % — SIGNIFICANT CHANGE UP (ref 20.5–51.1)
MCHC RBC-ENTMCNC: 29 PG — SIGNIFICANT CHANGE UP (ref 27–31)
MCHC RBC-ENTMCNC: 31.7 G/DL — LOW (ref 32–37)
MCV RBC AUTO: 91.5 FL — SIGNIFICANT CHANGE UP (ref 81–99)
MONOCYTES # BLD AUTO: 0.7 K/UL — HIGH (ref 0.1–0.6)
MONOCYTES NFR BLD AUTO: 6.3 % — SIGNIFICANT CHANGE UP (ref 1.7–9.3)
NEUTROPHILS # BLD AUTO: 5.55 K/UL — SIGNIFICANT CHANGE UP (ref 1.4–6.5)
NEUTROPHILS NFR BLD AUTO: 50 % — SIGNIFICANT CHANGE UP (ref 42.2–75.2)
NRBC # BLD: 0 /100 WBCS — SIGNIFICANT CHANGE UP (ref 0–0)
PLATELET # BLD AUTO: 263 K/UL — SIGNIFICANT CHANGE UP (ref 130–400)
POTASSIUM SERPL-MCNC: 4.3 MMOL/L — SIGNIFICANT CHANGE UP (ref 3.5–5)
POTASSIUM SERPL-SCNC: 4.3 MMOL/L — SIGNIFICANT CHANGE UP (ref 3.5–5)
PROT SERPL-MCNC: 6.5 G/DL — SIGNIFICANT CHANGE UP (ref 6–8)
RBC # BLD: 4.48 M/UL — SIGNIFICANT CHANGE UP (ref 4.2–5.4)
RBC # FLD: 14.6 % — HIGH (ref 11.5–14.5)
SODIUM SERPL-SCNC: 143 MMOL/L — SIGNIFICANT CHANGE UP (ref 135–146)
WBC # BLD: 11.09 K/UL — HIGH (ref 4.8–10.8)
WBC # FLD AUTO: 11.09 K/UL — HIGH (ref 4.8–10.8)

## 2020-01-06 RX ORDER — NYSTATIN CREAM 100000 [USP'U]/G
1 CREAM TOPICAL
Refills: 0 | Status: DISCONTINUED | OUTPATIENT
Start: 2020-01-06 | End: 2020-01-10

## 2020-01-06 RX ORDER — LATANOPROST 0.05 MG/ML
1 SOLUTION/ DROPS OPHTHALMIC; TOPICAL AT BEDTIME
Refills: 0 | Status: DISCONTINUED | OUTPATIENT
Start: 2020-01-06 | End: 2020-01-10

## 2020-01-06 RX ORDER — ACETAMINOPHEN 500 MG
650 TABLET ORAL ONCE
Refills: 0 | Status: COMPLETED | OUTPATIENT
Start: 2020-01-06 | End: 2020-01-06

## 2020-01-06 RX ADMIN — Medication 3 MILLILITER(S): at 20:07

## 2020-01-06 RX ADMIN — Medication 3 MILLILITER(S): at 08:35

## 2020-01-06 RX ADMIN — Medication 10 MILLILITER(S): at 05:30

## 2020-01-06 RX ADMIN — Medication 60 MILLIGRAM(S): at 17:20

## 2020-01-06 RX ADMIN — Medication 10 MILLILITER(S): at 21:06

## 2020-01-06 RX ADMIN — Medication 10 MILLILITER(S): at 10:26

## 2020-01-06 RX ADMIN — Medication 10 MILLILITER(S): at 17:20

## 2020-01-06 RX ADMIN — ESCITALOPRAM OXALATE 10 MILLIGRAM(S): 10 TABLET, FILM COATED ORAL at 11:35

## 2020-01-06 RX ADMIN — Medication 10 MILLILITER(S): at 13:38

## 2020-01-06 RX ADMIN — Medication 650 MILLIGRAM(S): at 16:40

## 2020-01-06 RX ADMIN — Medication 650 MILLIGRAM(S): at 15:34

## 2020-01-06 RX ADMIN — Medication 3 MILLILITER(S): at 14:47

## 2020-01-06 RX ADMIN — Medication 40 MILLIGRAM(S): at 05:30

## 2020-01-06 RX ADMIN — BUDESONIDE AND FORMOTEROL FUMARATE DIHYDRATE 2 PUFF(S): 160; 4.5 AEROSOL RESPIRATORY (INHALATION) at 20:00

## 2020-01-06 RX ADMIN — AMLODIPINE BESYLATE 5 MILLIGRAM(S): 2.5 TABLET ORAL at 05:30

## 2020-01-06 RX ADMIN — Medication 3 MILLIGRAM(S): at 21:06

## 2020-01-06 RX ADMIN — NYSTATIN CREAM 1 APPLICATION(S): 100000 CREAM TOPICAL at 23:04

## 2020-01-06 RX ADMIN — ENOXAPARIN SODIUM 40 MILLIGRAM(S): 100 INJECTION SUBCUTANEOUS at 11:35

## 2020-01-06 NOTE — PROGRESS NOTE ADULT - SUBJECTIVE AND OBJECTIVE BOX
OVERNIGHT EVENTS: feels better, pf 150cc used bipap at night, still coughing    Vital Signs Last 24 Hrs  T(C): 35.3 (06 Jan 2020 04:49), Max: 37.3 (05 Jan 2020 22:04)  T(F): 95.6 (06 Jan 2020 04:49), Max: 99.2 (05 Jan 2020 22:04)  HR: 84 (06 Jan 2020 04:49) (84 - 92)  BP: 129/80 (06 Jan 2020 04:49) (129/80 - 149/69)  RR: 18 (06 Jan 2020 04:49) (18 - 18)  SpO2: 97%    PHYSICAL EXAMINATION:    GENERAL: The patient is awake and alert in no apparent distress.     HEENT: Head is normocephalic and atraumatic. Extraocular muscles are intact. Mucous membranes are moist.    NECK: Supple.    LUNGS: bl wheezing    HEART: Regular rate and rhythm without murmur.    ABDOMEN: Soft, nontender, and nondistended.      EXTREMITIES: Without any cyanosis, clubbing, rash, lesions or edema.    NEUROLOGIC: Grossly intact.    SKIN: No ulceration or induration present.      LABS:                        13.6   10.01 )-----------( 282      ( 04 Jan 2020 07:49 )             43.0     01-04    141  |  99  |  28<H>  ----------------------------<  164<H>  4.0   |  24  |  0.9    Ca    9.4      04 Jan 2020 07:49    TPro  6.9  /  Alb  3.9  /  TBili  0.3  /  DBili  x   /  AST  50<H>  /  ALT  52<H>  /  AlkPhos  91  01-04                            MICROBIOLOGY:  Culture Results:   No growth to date. (01-03 @ 06:27)      MEDICATIONS  (STANDING):  ALBUTerol    90 MICROgram(s) HFA Inhaler 1 Puff(s) Inhalation every 4 hours  albuterol/ipratropium for Nebulization 3 milliLiter(s) Nebulizer every 6 hours  amLODIPine   Tablet 5 milliGRAM(s) Oral daily  budesonide 160 MICROgram(s)/formoterol 4.5 MICROgram(s) Inhaler 2 Puff(s) Inhalation two times a day  chlorhexidine 4% Liquid 1 Application(s) Topical <User Schedule>  enoxaparin Injectable 40 milliGRAM(s) SubCutaneous daily  escitalopram 10 milliGRAM(s) Oral daily  guaifenesin/dextromethorphan  Syrup 10 milliLiter(s) Oral every 4 hours  melatonin 3 milliGRAM(s) Oral at bedtime  predniSONE   Tablet 40 milliGRAM(s) Oral daily  tiotropium 18 MICROgram(s) Capsule 1 Capsule(s) Inhalation daily    MEDICATIONS  (PRN):      RADIOLOGY & ADDITIONAL STUDIES:

## 2020-01-06 NOTE — PROGRESS NOTE ADULT - SUBJECTIVE AND OBJECTIVE BOX
Patient was seen and examined. Spoke with RN. Chart reviewed.  No events overnight. Feeling much better  Vital Signs Last 24 Hrs  T(F): 95.6 (06 Jan 2020 04:49), Max: 99.2 (05 Jan 2020 22:04)  HR: 84 (06 Jan 2020 04:49) (84 - 92)  BP: 129/80 (06 Jan 2020 04:49) (129/80 - 149/69)  SpO2: --  MEDICATIONS  (STANDING):  ALBUTerol    90 MICROgram(s) HFA Inhaler 1 Puff(s) Inhalation every 4 hours  albuterol/ipratropium for Nebulization 3 milliLiter(s) Nebulizer every 6 hours  amLODIPine   Tablet 5 milliGRAM(s) Oral daily  budesonide 160 MICROgram(s)/formoterol 4.5 MICROgram(s) Inhaler 2 Puff(s) Inhalation two times a day  chlorhexidine 4% Liquid 1 Application(s) Topical <User Schedule>  enoxaparin Injectable 40 milliGRAM(s) SubCutaneous daily  escitalopram 10 milliGRAM(s) Oral daily  guaifenesin/dextromethorphan  Syrup 10 milliLiter(s) Oral every 4 hours  melatonin 3 milliGRAM(s) Oral at bedtime  methylPREDNISolone sodium succinate Injectable 60 milliGRAM(s) IV Push every 12 hours  tiotropium 18 MICROgram(s) Capsule 1 Capsule(s) Inhalation daily    MEDICATIONS  (PRN):    Labs:                        13.0   11.09 )-----------( 263      ( 06 Jan 2020 07:38 )             41.0     06 Jan 2020 07:38    143    |  101    |  23     ----------------------------<  116    4.3     |  26     |  0.7      Ca    9.0        06 Jan 2020 07:38    TPro  6.5    /  Alb  4.0    /  TBili  0.4    /  DBili  x      /  AST  64     /  ALT  109    /  AlkPhos  x      06 Jan 2020 07:38          General: comfortable, NAD  Neurology: A&Ox3, nonfocal  Head:  Normocephalic, atraumatic  ENT:  Mucosa moist, no ulcerations  Neck:  Supple, no JVD,   Skin: no breakdowns (as per RN)  Resp: mild wheezing B/L  CV: RRR, S1S2,   GI: Soft, NT, bowel sounds, obese  MS: No edema, + peripheral pulses, FROM all 4 extremity      A/P:  54  year old woman with history of sarcoidosis, asthma, anxiety, CHARO, pre DM admitted with acute asthma exacerbation secondary to RSV bronchitis    PO steroids 60 for 3 days, 40 for 3 days, 20 for 3 days    bronchodilators    pulm f/u in 2-3 days as outpt    DC today    DVT prophylaxis  Decubitus prevention- all measures as per RN protocol  Please call or text me with any questions or updates Patient was seen and examined. Spoke with RN. Chart reviewed.  No events overnight. Feeling much better but still wheezing  Vital Signs Last 24 Hrs  T(F): 95.6 (06 Jan 2020 04:49), Max: 99.2 (05 Jan 2020 22:04)  HR: 84 (06 Jan 2020 04:49) (84 - 92)  BP: 129/80 (06 Jan 2020 04:49) (129/80 - 149/69)  SpO2: --  MEDICATIONS  (STANDING):  ALBUTerol    90 MICROgram(s) HFA Inhaler 1 Puff(s) Inhalation every 4 hours  albuterol/ipratropium for Nebulization 3 milliLiter(s) Nebulizer every 6 hours  amLODIPine   Tablet 5 milliGRAM(s) Oral daily  budesonide 160 MICROgram(s)/formoterol 4.5 MICROgram(s) Inhaler 2 Puff(s) Inhalation two times a day  chlorhexidine 4% Liquid 1 Application(s) Topical <User Schedule>  enoxaparin Injectable 40 milliGRAM(s) SubCutaneous daily  escitalopram 10 milliGRAM(s) Oral daily  guaifenesin/dextromethorphan  Syrup 10 milliLiter(s) Oral every 4 hours  melatonin 3 milliGRAM(s) Oral at bedtime  methylPREDNISolone sodium succinate Injectable 60 milliGRAM(s) IV Push every 12 hours  tiotropium 18 MICROgram(s) Capsule 1 Capsule(s) Inhalation daily    MEDICATIONS  (PRN):    Labs:                        13.0   11.09 )-----------( 263      ( 06 Jan 2020 07:38 )             41.0     06 Jan 2020 07:38    143    |  101    |  23     ----------------------------<  116    4.3     |  26     |  0.7      Ca    9.0        06 Jan 2020 07:38    TPro  6.5    /  Alb  4.0    /  TBili  0.4    /  DBili  x      /  AST  64     /  ALT  109    /  AlkPhos  x      06 Jan 2020 07:38          General: comfortable, NAD at rest, but dyspnea on exertion  Neurology: A&Ox3, nonfocal  Head:  Normocephalic, atraumatic  ENT:  Mucosa moist, no ulcerations  Neck:  Supple, no JVD,   Skin: no breakdowns (as per RN)  Resp: mild wheezing B/L  CV: RRR, S1S2,   GI: Soft, NT, bowel sounds, obese  MS: No edema, + peripheral pulses, FROM all 4 extremity      A/P:  54  year old woman with history of sarcoidosis, asthma, anxiety, CHARO, pre DM admitted with acute asthma exacerbation secondary to RSV bronchitis    IV steroids as per pulm    bronchodilators    pulm f/u appreciated    DC when cleared by pulm- hopefully in 1-2 days    DVT prophylaxis  Decubitus prevention- all measures as per RN protocol  Please call or text me with any questions or updates

## 2020-01-06 NOTE — PROGRESS NOTE ADULT - ASSESSMENT
54  year old female with history of sarcoidosis, asthma, anxiety, CHARO, pre DM presents with SOB for 4 days duration.    # Acute Asthma exacerbation secondary to RSV bronchitis  # H/O Sarcoidosis  - CXR clear, RSV +, no fever or leukocytosis  - flu negative   - s/p one dose of Levaquin in ED,   - s/p 125 mg solumedrol 5* AMNA/ ipratropium and MG in ED, with improvement. in ED  - c/w AMNA Q4 and prednisone 40 mg PO q24, Symbicort 160mg 2puff q12  - f/u pulm, possible d/c today pending pulm clearance    # HTN- not on any home medications, if BP remains persistently elevated, will start amlodipine 5mg q24  # H/O CHARO noncompliant with CPAP.- use CPAP at night  # H/O Anxiety c/w home meds     # DASH diet   # DVT PPX LOVENOX  # DISPO acute  # FULL CODE 54  year old female with history of sarcoidosis, asthma, anxiety, CHARO, pre DM presents with SOB for 4 days duration.    # Acute Asthma exacerbation secondary to RSV bronchitis  # H/O Sarcoidosis  - CXR clear, RSV +, no fever or leukocytosis  - flu negative   - s/p one dose of Levaquin in ED,   - s/p 125 mg solumedrol 5* AMNA/ ipratropium and MG in ED, with improvement. in ED  - c/w AMNA Q4 and Symbicort 160mg 2puff q12  - IV solumedrol 60mg IV q12  - monitor peak flow q shift as per pulm  - f/u pulm, possible d/c today pending pulm clearance    # HTN- not on any home medications, if BP remains persistently elevated, will start amlodipine 5mg q24  # H/O CHARO noncompliant with CPAP.- use CPAP at night  # H/O Anxiety c/w home meds     # DASH diet   # DVT PPX LOVENOX  # DISPO acute  # FULL CODE

## 2020-01-06 NOTE — PROGRESS NOTE ADULT - SUBJECTIVE AND OBJECTIVE BOX
SUBJECTIVE:    Patient is a 54y old Female who presents with a chief complaint of SOB (05 Jan 2020 10:57)    Currently admitted to medicine with the primary diagnosis of Shortness of breath     Today is hospital day 4d. This morning she is resting comfortably in bed and reports no new issues or overnight events.     PAST MEDICAL & SURGICAL HISTORY  Prediabetes  Sarcoidosis  Asthma    SOCIAL HISTORY:  Negative for smoking/alcohol/drug use.     ALLERGIES:  dust (Unknown)  peanuts (Vomiting; Faint; Nausea; Urticaria; Short breath; Headache)  penicillins (Anaphylaxis)    MEDICATIONS:  STANDING MEDICATIONS  ALBUTerol    90 MICROgram(s) HFA Inhaler 1 Puff(s) Inhalation every 4 hours  albuterol/ipratropium for Nebulization 3 milliLiter(s) Nebulizer every 6 hours  amLODIPine   Tablet 5 milliGRAM(s) Oral daily  budesonide 160 MICROgram(s)/formoterol 4.5 MICROgram(s) Inhaler 2 Puff(s) Inhalation two times a day  chlorhexidine 4% Liquid 1 Application(s) Topical <User Schedule>  enoxaparin Injectable 40 milliGRAM(s) SubCutaneous daily  escitalopram 10 milliGRAM(s) Oral daily  guaifenesin/dextromethorphan  Syrup 10 milliLiter(s) Oral every 4 hours  melatonin 3 milliGRAM(s) Oral at bedtime  predniSONE   Tablet 40 milliGRAM(s) Oral daily  tiotropium 18 MICROgram(s) Capsule 1 Capsule(s) Inhalation daily    PRN MEDICATIONS    VITALS:   T(F): 95.6  HR: 84  BP: 129/80  RR: 18  SpO2: --    LABS:                        13.6   10.01 )-----------( 282      ( 04 Jan 2020 07:49 )             43.0     01-04    141  |  99  |  28<H>  ----------------------------<  164<H>  4.0   |  24  |  0.9    Ca    9.4      04 Jan 2020 07:49    TPro  6.9  /  Alb  3.9  /  TBili  0.3  /  DBili  x   /  AST  50<H>  /  ALT  52<H>  /  AlkPhos  91  01-04                  RADIOLOGY:  no radiology in past 24 hours.  PHYSICAL EXAM:  General: In NAD  HEENT: WILBER             Lymphatic system: No cervical LN     Lungs: Decreased BS at the bases, no wheezing  Cardiovascular: Regular, no LE edema  Gastrointestinal: Soft.  + BS   Musculoskeletal: No Clubbing.  Full range of motion.. Moves all extremities  Skin: Warm.  Intact  Neurological: No motor or sensory deficit SUBJECTIVE:    Patient is a 54y old Female who presents with a chief complaint of SOB (05 Jan 2020 10:57)    Currently admitted to medicine with the primary diagnosis of Shortness of breath     Today is hospital day 4d. This morning she is resting comfortably in bed and reports no new issues or overnight events.     PAST MEDICAL & SURGICAL HISTORY  Prediabetes  Sarcoidosis  Asthma    SOCIAL HISTORY:  Negative for smoking/alcohol/drug use.     ALLERGIES:  dust (Unknown)  peanuts (Vomiting; Faint; Nausea; Urticaria; Short breath; Headache)  penicillins (Anaphylaxis)    MEDICATIONS:  STANDING MEDICATIONS  ALBUTerol    90 MICROgram(s) HFA Inhaler 1 Puff(s) Inhalation every 4 hours  albuterol/ipratropium for Nebulization 3 milliLiter(s) Nebulizer every 6 hours  amLODIPine   Tablet 5 milliGRAM(s) Oral daily  budesonide 160 MICROgram(s)/formoterol 4.5 MICROgram(s) Inhaler 2 Puff(s) Inhalation two times a day  chlorhexidine 4% Liquid 1 Application(s) Topical <User Schedule>  enoxaparin Injectable 40 milliGRAM(s) SubCutaneous daily  escitalopram 10 milliGRAM(s) Oral daily  guaifenesin/dextromethorphan  Syrup 10 milliLiter(s) Oral every 4 hours  melatonin 3 milliGRAM(s) Oral at bedtime  predniSONE   Tablet 40 milliGRAM(s) Oral daily  tiotropium 18 MICROgram(s) Capsule 1 Capsule(s) Inhalation daily    PRN MEDICATIONS    VITALS:   T(F): 95.6  HR: 84  BP: 129/80  RR: 18  SpO2: --    LABS:                        13.6   10.01 )-----------( 282      ( 04 Jan 2020 07:49 )             43.0     01-04    141  |  99  |  28<H>  ----------------------------<  164<H>  4.0   |  24  |  0.9    Ca    9.4      04 Jan 2020 07:49    TPro  6.9  /  Alb  3.9  /  TBili  0.3  /  DBili  x   /  AST  50<H>  /  ALT  52<H>  /  AlkPhos  91  01-04                  RADIOLOGY:  no radiology in past 24 hours.  PHYSICAL EXAM:  General: In NAD  HEENT: WILBER             Lymphatic system: No cervical LN     Lungs: Decreased BS at the bases, mild b/l wheezing  Cardiovascular: Regular, no LE edema  Gastrointestinal: Soft.  + BS   Musculoskeletal: No Clubbing.  Full range of motion.. Moves all extremities  Skin: Warm.  Intact  Neurological: No motor or sensory deficit

## 2020-01-06 NOTE — PROGRESS NOTE ADULT - ASSESSMENT
impression:    Acute asthma exacerbation 2ry to RSV  h/o of sarcoidosis stable last chest ct 11.19  CHARO on CPAP(non-compliant)    Plan:    Check peak flow q shift  Solumedrol 60 q 12h  increased symbicort to 160  Neb q 4 h  Continue symbicort  DVT ppx  OOB to chair  not ready for dc home yet

## 2020-01-07 LAB
ALBUMIN SERPL ELPH-MCNC: 3.8 G/DL — SIGNIFICANT CHANGE UP (ref 3.5–5.2)
ALP SERPL-CCNC: 95 U/L — SIGNIFICANT CHANGE UP (ref 30–115)
ALT FLD-CCNC: 85 U/L — HIGH (ref 0–41)
ANION GAP SERPL CALC-SCNC: 12 MMOL/L — SIGNIFICANT CHANGE UP (ref 7–14)
AST SERPL-CCNC: 31 U/L — SIGNIFICANT CHANGE UP (ref 0–41)
BASOPHILS # BLD AUTO: 0.02 K/UL — SIGNIFICANT CHANGE UP (ref 0–0.2)
BASOPHILS NFR BLD AUTO: 0.2 % — SIGNIFICANT CHANGE UP (ref 0–1)
BILIRUB SERPL-MCNC: 0.3 MG/DL — SIGNIFICANT CHANGE UP (ref 0.2–1.2)
BUN SERPL-MCNC: 19 MG/DL — SIGNIFICANT CHANGE UP (ref 10–20)
CALCIUM SERPL-MCNC: 9.2 MG/DL — SIGNIFICANT CHANGE UP (ref 8.5–10.1)
CHLORIDE SERPL-SCNC: 102 MMOL/L — SIGNIFICANT CHANGE UP (ref 98–110)
CO2 SERPL-SCNC: 26 MMOL/L — SIGNIFICANT CHANGE UP (ref 17–32)
CREAT SERPL-MCNC: 0.7 MG/DL — SIGNIFICANT CHANGE UP (ref 0.7–1.5)
EOSINOPHIL # BLD AUTO: 0.01 K/UL — SIGNIFICANT CHANGE UP (ref 0–0.7)
EOSINOPHIL NFR BLD AUTO: 0.1 % — SIGNIFICANT CHANGE UP (ref 0–8)
GLUCOSE BLDC GLUCOMTR-MCNC: 111 MG/DL — HIGH (ref 70–99)
GLUCOSE BLDC GLUCOMTR-MCNC: 113 MG/DL — HIGH (ref 70–99)
GLUCOSE BLDC GLUCOMTR-MCNC: 113 MG/DL — HIGH (ref 70–99)
GLUCOSE BLDC GLUCOMTR-MCNC: 162 MG/DL — HIGH (ref 70–99)
GLUCOSE SERPL-MCNC: 127 MG/DL — HIGH (ref 70–99)
HCT VFR BLD CALC: 40.5 % — SIGNIFICANT CHANGE UP (ref 37–47)
HGB BLD-MCNC: 12.8 G/DL — SIGNIFICANT CHANGE UP (ref 12–16)
IMM GRANULOCYTES NFR BLD AUTO: 1.7 % — HIGH (ref 0.1–0.3)
LYMPHOCYTES # BLD AUTO: 19.7 % — LOW (ref 20.5–51.1)
LYMPHOCYTES # BLD AUTO: 2.62 K/UL — SIGNIFICANT CHANGE UP (ref 1.2–3.4)
MCHC RBC-ENTMCNC: 28.9 PG — SIGNIFICANT CHANGE UP (ref 27–31)
MCHC RBC-ENTMCNC: 31.6 G/DL — LOW (ref 32–37)
MCV RBC AUTO: 91.4 FL — SIGNIFICANT CHANGE UP (ref 81–99)
MONOCYTES # BLD AUTO: 0.72 K/UL — HIGH (ref 0.1–0.6)
MONOCYTES NFR BLD AUTO: 5.4 % — SIGNIFICANT CHANGE UP (ref 1.7–9.3)
NEUTROPHILS # BLD AUTO: 9.71 K/UL — HIGH (ref 1.4–6.5)
NEUTROPHILS NFR BLD AUTO: 72.9 % — SIGNIFICANT CHANGE UP (ref 42.2–75.2)
NRBC # BLD: 0 /100 WBCS — SIGNIFICANT CHANGE UP (ref 0–0)
PLATELET # BLD AUTO: 273 K/UL — SIGNIFICANT CHANGE UP (ref 130–400)
POTASSIUM SERPL-MCNC: 4.8 MMOL/L — SIGNIFICANT CHANGE UP (ref 3.5–5)
POTASSIUM SERPL-SCNC: 4.8 MMOL/L — SIGNIFICANT CHANGE UP (ref 3.5–5)
PROT SERPL-MCNC: 6.7 G/DL — SIGNIFICANT CHANGE UP (ref 6–8)
RBC # BLD: 4.43 M/UL — SIGNIFICANT CHANGE UP (ref 4.2–5.4)
RBC # FLD: 14.2 % — SIGNIFICANT CHANGE UP (ref 11.5–14.5)
SODIUM SERPL-SCNC: 140 MMOL/L — SIGNIFICANT CHANGE UP (ref 135–146)
WBC # BLD: 13.3 K/UL — HIGH (ref 4.8–10.8)
WBC # FLD AUTO: 13.3 K/UL — HIGH (ref 4.8–10.8)

## 2020-01-07 RX ADMIN — Medication 60 MILLIGRAM(S): at 17:02

## 2020-01-07 RX ADMIN — Medication 3 MILLILITER(S): at 21:03

## 2020-01-07 RX ADMIN — Medication 10 MILLILITER(S): at 21:07

## 2020-01-07 RX ADMIN — Medication 10 MILLILITER(S): at 13:44

## 2020-01-07 RX ADMIN — Medication 3 MILLIGRAM(S): at 21:09

## 2020-01-07 RX ADMIN — BUDESONIDE AND FORMOTEROL FUMARATE DIHYDRATE 2 PUFF(S): 160; 4.5 AEROSOL RESPIRATORY (INHALATION) at 21:09

## 2020-01-07 RX ADMIN — Medication 3 MILLILITER(S): at 08:35

## 2020-01-07 RX ADMIN — Medication 10 MILLILITER(S): at 05:16

## 2020-01-07 RX ADMIN — AMLODIPINE BESYLATE 5 MILLIGRAM(S): 2.5 TABLET ORAL at 05:16

## 2020-01-07 RX ADMIN — NYSTATIN CREAM 1 APPLICATION(S): 100000 CREAM TOPICAL at 05:16

## 2020-01-07 RX ADMIN — Medication 10 MILLILITER(S): at 17:03

## 2020-01-07 RX ADMIN — ENOXAPARIN SODIUM 40 MILLIGRAM(S): 100 INJECTION SUBCUTANEOUS at 11:14

## 2020-01-07 RX ADMIN — NYSTATIN CREAM 1 APPLICATION(S): 100000 CREAM TOPICAL at 17:03

## 2020-01-07 RX ADMIN — ESCITALOPRAM OXALATE 10 MILLIGRAM(S): 10 TABLET, FILM COATED ORAL at 11:14

## 2020-01-07 RX ADMIN — BUDESONIDE AND FORMOTEROL FUMARATE DIHYDRATE 2 PUFF(S): 160; 4.5 AEROSOL RESPIRATORY (INHALATION) at 08:02

## 2020-01-07 RX ADMIN — Medication 3 MILLILITER(S): at 13:26

## 2020-01-07 RX ADMIN — Medication 10 MILLILITER(S): at 11:14

## 2020-01-07 RX ADMIN — Medication 60 MILLIGRAM(S): at 05:16

## 2020-01-07 NOTE — PROGRESS NOTE ADULT - ASSESSMENT
impression:    Acute asthma exacerbation 2ry to RSV  h/o of sarcoidosis stable last chest ct 11.19  CHARO on CPAP(non-compliant)    Plan:    Check peak flow q shift  Solumedrol 60 q 12h keep today  increased symbicort to 160/ 4.5  Neb q 4 h  DVT ppx  OOB to chair  attempt dc am

## 2020-01-07 NOTE — PROGRESS NOTE ADULT - ASSESSMENT
54  year old female with history of sarcoidosis, asthma, anxiety, CHARO, pre DM presents with SOB for 4 days duration.    # Acute Asthma exacerbation secondary to RSV bronchitis  # H/O Sarcoidosis  - CXR clear, RSV +, no fever or leukocytosis  - flu negative   - s/p one dose of Levaquin in ED,   - s/p 125 mg solumedrol 5* AMNA/ ipratropium and MG in ED, with improvement. in ED  - started on solumedrol 60mg IV q12 as per pulm  - c/w AMNA Q4,  Symbicort 160mg 2puff q12  - f/u pulm     # HTN- not on any home medications, if BP remains persistently elevated, will start amlodipine 5mg q24  # H/O CHARO noncompliant with CPAP.- use CPAP at night  # H/O Anxiety c/w home meds     # DASH diet   # DVT PPX LOVENOX  # DISPO acute  # FULL CODE 54  year old female with history of sarcoidosis, asthma, anxiety, CHARO, pre DM presents with SOB for 4 days duration.    # Acute Asthma exacerbation secondary to RSV bronchitis  # H/O Sarcoidosis  - CXR clear, RSV +, no fever or leukocytosis  - flu negative   - s/p one dose of Levaquin in ED,   - s/p 125 mg solumedrol 5* AMNA/ ipratropium and MG in ED, with improvement. in ED  - started on solumedrol 60mg IV q12 as per pulm  - c/w AMNA Q4,  Symbicort 160mg 2puff q12  - f/u pulm, pssoible d/c tomorrow    # HTN- not on any home medications, if BP remains persistently elevated, will start amlodipine 5mg q24  # H/O CHARO noncompliant with CPAP.- use CPAP at night  # H/O Anxiety c/w home meds     # DASH diet   # DVT PPX LOVENOX  # DISPO acute  # FULL CODE

## 2020-01-07 NOTE — PROGRESS NOTE ADULT - SUBJECTIVE AND OBJECTIVE BOX
Patient was seen and examined. Spoke with RN/resident. Chart reviewed.  No events overnight.  pt sitting in her bed eating breakfast  Pt with sob worse with any movement  No new issue - restarted on iv steroids  No fever/chills    Vital Signs Last 24 Hrs  T(F): 95.9 (07 Jan 2020 04:53), Max: 97.8 (06 Jan 2020 21:23)  HR: 80 (07 Jan 2020 04:53) (80 - 99)  BP: 147/71 (07 Jan 2020 04:53) (147/71 - 170/74)  SpO2: 98% (06 Jan 2020 13:00) (98% - 98%)    MEDICATIONS  (STANDING):  ALBUTerol    90 MICROgram(s) HFA Inhaler 1 Puff(s) Inhalation every 4 hours  albuterol/ipratropium for Nebulization 3 milliLiter(s) Nebulizer every 6 hours  amLODIPine   Tablet 5 milliGRAM(s) Oral daily  budesonide 160 MICROgram(s)/formoterol 4.5 MICROgram(s) Inhaler 2 Puff(s) Inhalation two times a day  chlorhexidine 4% Liquid 1 Application(s) Topical <User Schedule>  enoxaparin Injectable 40 milliGRAM(s) SubCutaneous daily  escitalopram 10 milliGRAM(s) Oral daily  guaifenesin/dextromethorphan  Syrup 10 milliLiter(s) Oral every 4 hours  latanoprost 0.005% Ophthalmic Solution 1 Drop(s) Both EYES at bedtime  melatonin 3 milliGRAM(s) Oral at bedtime  methylPREDNISolone sodium succinate Injectable 60 milliGRAM(s) IV Push every 12 hours  nystatin Cream 1 Application(s) Topical two times a day  tiotropium 18 MICROgram(s) Capsule 1 Capsule(s) Inhalation daily    MEDICATIONS  (PRN):    Labs:                        13.0   11.09 )-----------( 263      ( 06 Jan 2020 07:38 )             41.0     06 Jan 2020 07:38    143    |  101    |  23     ----------------------------<  116    4.3     |  26     |  0.7      Ca    9.0        06 Jan 2020 07:38    TPro  6.5    /  Alb  4.0    /  TBili  0.4    /  DBili  x      /  AST  64     /  ALT  109    /  AlkPhos  95     06 Jan 2020 07:38          General: comfortable, NAD  Neurology: A&Ox3, nonfocal  Head:  Normocephalic, atraumatic  ENT:  Mucosa moist, no ulcerations  Neck:  Supple, no JVD,   Skin: no breakdowns (as per RN)  Resp: diffuse rhonchi and expiratory wheezing   CV: RRR, S1S2,   GI: Soft, NT, bowel sounds  MS: No edema,      A/P:    54  year old female with history of sarcoidosis, asthma, anxiety, CHARO, pre DM presents with SOB for 4 days duration.      asthma - h/o sarcoid  - back on IV steriods  - Brochodilators  - Pulm follow up in chart  - Monitor  - d/c planning when on oral meds    HTN   - on norvasc    cont all other meds    DVT prophylaxis  Decubitus prevention- all measures as per RN protocol  Please call or text me with any questions or updates

## 2020-01-07 NOTE — PROGRESS NOTE ADULT - SUBJECTIVE AND OBJECTIVE BOX
OVERNIGHT EVENTS: events noted all over better, still wheezing    Vital Signs Last 24 Hrs  T(C): 35.5 (07 Jan 2020 04:53), Max: 36.6 (06 Jan 2020 21:23)  T(F): 95.9 (07 Jan 2020 04:53), Max: 97.8 (06 Jan 2020 21:23)  HR: 80 (07 Jan 2020 04:53) (80 - 99)  BP: 147/71 (07 Jan 2020 04:53) (147/71 - 170/74)  RR: 18 (07 Jan 2020 04:53) (18 - 20)  SpO2: 98% (06 Jan 2020 13:00) (98% - 98%)    PHYSICAL EXAMINATION:    GENERAL: The patient is awake and alert in no apparent distress.     HEENT: Head is normocephalic and atraumatic. Extraocular muscles are intact. Mucous membranes are moist.    NECK: Supple.    LUNGS: diffuse wheezing    HEART: Regular rate and rhythm without murmur.    ABDOMEN: Soft, nontender, and nondistended.      EXTREMITIES: Without any cyanosis, clubbing, rash, lesions or edema.    NEUROLOGIC: Grossly intact.    SKIN: No ulceration or induration present.      LABS:                        13.0   11.09 )-----------( 263      ( 06 Jan 2020 07:38 )             41.0     01-06    143  |  101  |  23<H>  ----------------------------<  116<H>  4.3   |  26  |  0.7    Ca    9.0      06 Jan 2020 07:38    TPro  6.5  /  Alb  4.0  /  TBili  0.4  /  DBili  x   /  AST  64<H>  /  ALT  109<H>  /  AlkPhos  95  01-06                            MICROBIOLOGY:      MEDICATIONS  (STANDING):  ALBUTerol    90 MICROgram(s) HFA Inhaler 1 Puff(s) Inhalation every 4 hours  albuterol/ipratropium for Nebulization 3 milliLiter(s) Nebulizer every 6 hours  amLODIPine   Tablet 5 milliGRAM(s) Oral daily  budesonide 160 MICROgram(s)/formoterol 4.5 MICROgram(s) Inhaler 2 Puff(s) Inhalation two times a day  chlorhexidine 4% Liquid 1 Application(s) Topical <User Schedule>  enoxaparin Injectable 40 milliGRAM(s) SubCutaneous daily  escitalopram 10 milliGRAM(s) Oral daily  guaifenesin/dextromethorphan  Syrup 10 milliLiter(s) Oral every 4 hours  latanoprost 0.005% Ophthalmic Solution 1 Drop(s) Both EYES at bedtime  melatonin 3 milliGRAM(s) Oral at bedtime  methylPREDNISolone sodium succinate Injectable 60 milliGRAM(s) IV Push every 12 hours  nystatin Cream 1 Application(s) Topical two times a day  tiotropium 18 MICROgram(s) Capsule 1 Capsule(s) Inhalation daily    MEDICATIONS  (PRN):      RADIOLOGY & ADDITIONAL STUDIES:

## 2020-01-07 NOTE — PROGRESS NOTE ADULT - SUBJECTIVE AND OBJECTIVE BOX
SUBJECTIVE:    Patient is a 54y old Female who presents with a chief complaint of SOB (06 Jan 2020 10:21)    Currently admitted to medicine with the primary diagnosis of Shortness of breath     Today is hospital day 5d. This morning she is resting comfortably in bed and reports no new issues or overnight events.     PAST MEDICAL & SURGICAL HISTORY  Prediabetes  Sarcoidosis  Asthma    SOCIAL HISTORY:  Negative for smoking/alcohol/drug use.     ALLERGIES:  dust (Unknown)  peanuts (Vomiting; Faint; Nausea; Urticaria; Short breath; Headache)  penicillins (Anaphylaxis)    MEDICATIONS:  STANDING MEDICATIONS  ALBUTerol    90 MICROgram(s) HFA Inhaler 1 Puff(s) Inhalation every 4 hours  albuterol/ipratropium for Nebulization 3 milliLiter(s) Nebulizer every 6 hours  amLODIPine   Tablet 5 milliGRAM(s) Oral daily  budesonide 160 MICROgram(s)/formoterol 4.5 MICROgram(s) Inhaler 2 Puff(s) Inhalation two times a day  chlorhexidine 4% Liquid 1 Application(s) Topical <User Schedule>  enoxaparin Injectable 40 milliGRAM(s) SubCutaneous daily  escitalopram 10 milliGRAM(s) Oral daily  guaifenesin/dextromethorphan  Syrup 10 milliLiter(s) Oral every 4 hours  latanoprost 0.005% Ophthalmic Solution 1 Drop(s) Both EYES at bedtime  melatonin 3 milliGRAM(s) Oral at bedtime  methylPREDNISolone sodium succinate Injectable 60 milliGRAM(s) IV Push every 12 hours  nystatin Cream 1 Application(s) Topical two times a day  tiotropium 18 MICROgram(s) Capsule 1 Capsule(s) Inhalation daily    PRN MEDICATIONS    VITALS:   T(F): 95.9  HR: 80  BP: 147/71  RR: 18  SpO2: 98%    LABS:                        13.0   11.09 )-----------( 263      ( 06 Jan 2020 07:38 )             41.0     01-06    143  |  101  |  23<H>  ----------------------------<  116<H>  4.3   |  26  |  0.7    Ca    9.0      06 Jan 2020 07:38    TPro  6.5  /  Alb  4.0  /  TBili  0.4  /  DBili  x   /  AST  64<H>  /  ALT  109<H>  /  AlkPhos  95  01-06                  RADIOLOGY:  < from: Xray Chest 1 View- PORTABLE-Routine (01.06.20 @ 06:16) >    Impression:      Mild pulmonary vascular congestion.    < end of copied text >    PHYSICAL EXAM:  General: In NAD  HEENT: WILBER             Lymphatic system: No cervical LN     Lungs: Decreased BS at the bases, no wheezing  Cardiovascular: Regular, no LE edema  Gastrointestinal: Soft.  + BS   Musculoskeletal: No Clubbing.  Full range of motion.. Moves all extremities  Skin: Warm.  Intact  Neurological: No motor or sensory deficit 3

## 2020-01-08 LAB
ALBUMIN SERPL ELPH-MCNC: 3.8 G/DL — SIGNIFICANT CHANGE UP (ref 3.5–5.2)
ALP SERPL-CCNC: 96 U/L — SIGNIFICANT CHANGE UP (ref 30–115)
ALT FLD-CCNC: 62 U/L — HIGH (ref 0–41)
ANION GAP SERPL CALC-SCNC: 16 MMOL/L — HIGH (ref 7–14)
ANISOCYTOSIS BLD QL: SLIGHT — SIGNIFICANT CHANGE UP
AST SERPL-CCNC: 21 U/L — SIGNIFICANT CHANGE UP (ref 0–41)
BASOPHILS # BLD AUTO: 0 K/UL — SIGNIFICANT CHANGE UP (ref 0–0.2)
BASOPHILS NFR BLD AUTO: 0 % — SIGNIFICANT CHANGE UP (ref 0–1)
BILIRUB SERPL-MCNC: <0.2 MG/DL — SIGNIFICANT CHANGE UP (ref 0.2–1.2)
BUN SERPL-MCNC: 20 MG/DL — SIGNIFICANT CHANGE UP (ref 10–20)
CALCIUM SERPL-MCNC: 9.1 MG/DL — SIGNIFICANT CHANGE UP (ref 8.5–10.1)
CHLORIDE SERPL-SCNC: 102 MMOL/L — SIGNIFICANT CHANGE UP (ref 98–110)
CO2 SERPL-SCNC: 24 MMOL/L — SIGNIFICANT CHANGE UP (ref 17–32)
CREAT SERPL-MCNC: 0.7 MG/DL — SIGNIFICANT CHANGE UP (ref 0.7–1.5)
CULTURE RESULTS: SIGNIFICANT CHANGE UP
EOSINOPHIL # BLD AUTO: 0 K/UL — SIGNIFICANT CHANGE UP (ref 0–0.7)
EOSINOPHIL NFR BLD AUTO: 0 % — SIGNIFICANT CHANGE UP (ref 0–8)
GIANT PLATELETS BLD QL SMEAR: PRESENT — SIGNIFICANT CHANGE UP
GLUCOSE BLDC GLUCOMTR-MCNC: 119 MG/DL — HIGH (ref 70–99)
GLUCOSE BLDC GLUCOMTR-MCNC: 121 MG/DL — HIGH (ref 70–99)
GLUCOSE BLDC GLUCOMTR-MCNC: 133 MG/DL — HIGH (ref 70–99)
GLUCOSE BLDC GLUCOMTR-MCNC: 155 MG/DL — HIGH (ref 70–99)
GLUCOSE SERPL-MCNC: 113 MG/DL — HIGH (ref 70–99)
HCT VFR BLD CALC: 39.3 % — SIGNIFICANT CHANGE UP (ref 37–47)
HGB BLD-MCNC: 12.4 G/DL — SIGNIFICANT CHANGE UP (ref 12–16)
LYMPHOCYTES # BLD AUTO: 1.56 K/UL — SIGNIFICANT CHANGE UP (ref 1.2–3.4)
LYMPHOCYTES # BLD AUTO: 9 % — LOW (ref 20.5–51.1)
MACROCYTES BLD QL: SLIGHT — SIGNIFICANT CHANGE UP
MANUAL SMEAR VERIFICATION: SIGNIFICANT CHANGE UP
MCHC RBC-ENTMCNC: 28.8 PG — SIGNIFICANT CHANGE UP (ref 27–31)
MCHC RBC-ENTMCNC: 31.6 G/DL — LOW (ref 32–37)
MCV RBC AUTO: 91.2 FL — SIGNIFICANT CHANGE UP (ref 81–99)
MONOCYTES # BLD AUTO: 1.25 K/UL — HIGH (ref 0.1–0.6)
MONOCYTES NFR BLD AUTO: 7.2 % — SIGNIFICANT CHANGE UP (ref 1.7–9.3)
MYELOCYTES NFR BLD: 0.9 % — HIGH (ref 0–0)
NEUTROPHILS # BLD AUTO: 14.09 K/UL — HIGH (ref 1.4–6.5)
NEUTROPHILS NFR BLD AUTO: 79.3 % — HIGH (ref 42.2–75.2)
NEUTS BAND # BLD: 1.8 % — SIGNIFICANT CHANGE UP (ref 0–6)
PLAT MORPH BLD: NORMAL — SIGNIFICANT CHANGE UP
PLATELET # BLD AUTO: 301 K/UL — SIGNIFICANT CHANGE UP (ref 130–400)
POTASSIUM SERPL-MCNC: 4.1 MMOL/L — SIGNIFICANT CHANGE UP (ref 3.5–5)
POTASSIUM SERPL-SCNC: 4.1 MMOL/L — SIGNIFICANT CHANGE UP (ref 3.5–5)
PROT SERPL-MCNC: 6.5 G/DL — SIGNIFICANT CHANGE UP (ref 6–8)
RBC # BLD: 4.31 M/UL — SIGNIFICANT CHANGE UP (ref 4.2–5.4)
RBC # FLD: 14.5 % — SIGNIFICANT CHANGE UP (ref 11.5–14.5)
RBC BLD AUTO: NORMAL — SIGNIFICANT CHANGE UP
SODIUM SERPL-SCNC: 142 MMOL/L — SIGNIFICANT CHANGE UP (ref 135–146)
SPECIMEN SOURCE: SIGNIFICANT CHANGE UP
VARIANT LYMPHS # BLD: 1.8 % — SIGNIFICANT CHANGE UP (ref 0–5)
WBC # BLD: 17.37 K/UL — HIGH (ref 4.8–10.8)
WBC # FLD AUTO: 17.37 K/UL — HIGH (ref 4.8–10.8)

## 2020-01-08 RX ORDER — ACETAMINOPHEN 500 MG
650 TABLET ORAL EVERY 6 HOURS
Refills: 0 | Status: DISCONTINUED | OUTPATIENT
Start: 2020-01-08 | End: 2020-01-10

## 2020-01-08 RX ORDER — AZITHROMYCIN 500 MG/1
500 TABLET, FILM COATED ORAL DAILY
Refills: 0 | Status: DISCONTINUED | OUTPATIENT
Start: 2020-01-08 | End: 2020-01-10

## 2020-01-08 RX ORDER — LANOLIN ALCOHOL/MO/W.PET/CERES
5 CREAM (GRAM) TOPICAL AT BEDTIME
Refills: 0 | Status: DISCONTINUED | OUTPATIENT
Start: 2020-01-08 | End: 2020-01-10

## 2020-01-08 RX ADMIN — Medication 10 MILLILITER(S): at 10:11

## 2020-01-08 RX ADMIN — LATANOPROST 1 DROP(S): 0.05 SOLUTION/ DROPS OPHTHALMIC; TOPICAL at 21:55

## 2020-01-08 RX ADMIN — Medication 650 MILLIGRAM(S): at 22:30

## 2020-01-08 RX ADMIN — NYSTATIN CREAM 1 APPLICATION(S): 100000 CREAM TOPICAL at 17:04

## 2020-01-08 RX ADMIN — Medication 10 MILLILITER(S): at 14:37

## 2020-01-08 RX ADMIN — AMLODIPINE BESYLATE 5 MILLIGRAM(S): 2.5 TABLET ORAL at 05:44

## 2020-01-08 RX ADMIN — Medication 5 MILLIGRAM(S): at 21:54

## 2020-01-08 RX ADMIN — Medication 60 MILLIGRAM(S): at 05:43

## 2020-01-08 RX ADMIN — Medication 10 MILLILITER(S): at 17:04

## 2020-01-08 RX ADMIN — ENOXAPARIN SODIUM 40 MILLIGRAM(S): 100 INJECTION SUBCUTANEOUS at 11:25

## 2020-01-08 RX ADMIN — BUDESONIDE AND FORMOTEROL FUMARATE DIHYDRATE 2 PUFF(S): 160; 4.5 AEROSOL RESPIRATORY (INHALATION) at 08:13

## 2020-01-08 RX ADMIN — Medication 3 MILLILITER(S): at 13:12

## 2020-01-08 RX ADMIN — Medication 10 MILLILITER(S): at 05:43

## 2020-01-08 RX ADMIN — Medication 60 MILLIGRAM(S): at 20:24

## 2020-01-08 RX ADMIN — Medication 3 MILLILITER(S): at 08:44

## 2020-01-08 RX ADMIN — Medication 650 MILLIGRAM(S): at 21:56

## 2020-01-08 RX ADMIN — AZITHROMYCIN 500 MILLIGRAM(S): 500 TABLET, FILM COATED ORAL at 11:25

## 2020-01-08 RX ADMIN — NYSTATIN CREAM 1 APPLICATION(S): 100000 CREAM TOPICAL at 05:43

## 2020-01-08 RX ADMIN — ESCITALOPRAM OXALATE 10 MILLIGRAM(S): 10 TABLET, FILM COATED ORAL at 11:25

## 2020-01-08 RX ADMIN — Medication 10 MILLILITER(S): at 21:54

## 2020-01-08 RX ADMIN — BUDESONIDE AND FORMOTEROL FUMARATE DIHYDRATE 2 PUFF(S): 160; 4.5 AEROSOL RESPIRATORY (INHALATION) at 20:25

## 2020-01-08 RX ADMIN — Medication 3 MILLILITER(S): at 19:23

## 2020-01-08 NOTE — PROGRESS NOTE ADULT - SUBJECTIVE AND OBJECTIVE BOX
OVERNIGHT EVENTS: events noted, frustrated upset (? never had bad asthma episode like this before??), cough productive however all over feels better,  CC    Vital Signs Last 24 Hrs  T(C): 35.9 (08 Jan 2020 04:46), Max: 37 (07 Jan 2020 13:04)  T(F): 96.7 (08 Jan 2020 04:46), Max: 98.6 (07 Jan 2020 13:04)  HR: 77 (08 Jan 2020 04:46) (77 - 99)  BP: 175/70 (08 Jan 2020 04:46) (145/82 - 175/70)  RR: 18 (08 Jan 2020 04:46) (18 - 20)  SpO2: 97% (07 Jan 2020 08:25) (97% - 97%)    PHYSICAL EXAMINATION:    GENERAL: The patient is awake and alert in no apparent distress.     HEENT: Head is normocephalic and atraumatic. Extraocular muscles are intact. Mucous membranes are moist.    NECK: Supple.    LUNGS: bl wheezinh    HEART: Regular rate and rhythm without murmur.    ABDOMEN: Soft, nontender, and nondistended.      EXTREMITIES: Without any cyanosis, clubbing, rash, lesions or edema.    NEUROLOGIC: Grossly intact.    SKIN: No ulceration or induration present.      LABS:                        12.8   13.30 )-----------( 273      ( 07 Jan 2020 06:41 )             40.5     01-07    140  |  102  |  19  ----------------------------<  127<H>  4.8   |  26  |  0.7    Ca    9.2      07 Jan 2020 06:41    TPro  6.7  /  Alb  3.8  /  TBili  0.3  /  DBili  x   /  AST  31  /  ALT  85<H>  /  AlkPhos  95  01-07                            MICROBIOLOGY:      MEDICATIONS  (STANDING):  ALBUTerol    90 MICROgram(s) HFA Inhaler 1 Puff(s) Inhalation every 4 hours  albuterol/ipratropium for Nebulization 3 milliLiter(s) Nebulizer every 6 hours  amLODIPine   Tablet 5 milliGRAM(s) Oral daily  budesonide 160 MICROgram(s)/formoterol 4.5 MICROgram(s) Inhaler 2 Puff(s) Inhalation two times a day  chlorhexidine 4% Liquid 1 Application(s) Topical <User Schedule>  enoxaparin Injectable 40 milliGRAM(s) SubCutaneous daily  escitalopram 10 milliGRAM(s) Oral daily  guaifenesin/dextromethorphan  Syrup 10 milliLiter(s) Oral every 4 hours  latanoprost 0.005% Ophthalmic Solution 1 Drop(s) Both EYES at bedtime  melatonin 3 milliGRAM(s) Oral at bedtime  nystatin Cream 1 Application(s) Topical two times a day  tiotropium 18 MICROgram(s) Capsule 1 Capsule(s) Inhalation daily    MEDICATIONS  (PRN):      RADIOLOGY & ADDITIONAL STUDIES:

## 2020-01-08 NOTE — PROGRESS NOTE ADULT - SUBJECTIVE AND OBJECTIVE BOX
SUBJECTIVE:    Patient is a 54y old Female who presents with a chief complaint of SOB (08 Jan 2020 07:22)    Currently admitted to medicine with the primary diagnosis of Shortness of breath     Today is hospital day 6d. This morning she is resting comfortably in bed and reports no new issues or overnight events.     PAST MEDICAL & SURGICAL HISTORY  Prediabetes  Sarcoidosis  Asthma    SOCIAL HISTORY:  Negative for smoking/alcohol/drug use.     ALLERGIES:  dust (Unknown)  peanuts (Vomiting; Faint; Nausea; Urticaria; Short breath; Headache)  penicillins (Anaphylaxis)    MEDICATIONS:  STANDING MEDICATIONS  ALBUTerol    90 MICROgram(s) HFA Inhaler 1 Puff(s) Inhalation every 4 hours  albuterol/ipratropium for Nebulization 3 milliLiter(s) Nebulizer every 6 hours  amLODIPine   Tablet 5 milliGRAM(s) Oral daily  azithromycin   Tablet 500 milliGRAM(s) Oral daily  budesonide 160 MICROgram(s)/formoterol 4.5 MICROgram(s) Inhaler 2 Puff(s) Inhalation two times a day  chlorhexidine 4% Liquid 1 Application(s) Topical <User Schedule>  enoxaparin Injectable 40 milliGRAM(s) SubCutaneous daily  escitalopram 10 milliGRAM(s) Oral daily  guaifenesin/dextromethorphan  Syrup 10 milliLiter(s) Oral every 4 hours  latanoprost 0.005% Ophthalmic Solution 1 Drop(s) Both EYES at bedtime  melatonin 3 milliGRAM(s) Oral at bedtime  nystatin Cream 1 Application(s) Topical two times a day  tiotropium 18 MICROgram(s) Capsule 1 Capsule(s) Inhalation daily    PRN MEDICATIONS    VITALS:   T(F): 96.7  HR: 77  BP: 175/70  RR: 18  SpO2: 97%    LABS:                        12.4   17.37 )-----------( 301      ( 08 Jan 2020 06:23 )             39.3     01-07    140  |  102  |  19  ----------------------------<  127<H>  4.8   |  26  |  0.7    Ca    9.2      07 Jan 2020 06:41    TPro  6.7  /  Alb  3.8  /  TBili  0.3  /  DBili  x   /  AST  31  /  ALT  85<H>  /  AlkPhos  95  01-07                  RADIOLOGY:  < from: Xray Chest 1 View- PORTABLE-Routine (01.07.20 @ 06:38) >  Stable mild pulmonary vascular congestion.    < end of copied text >    PHYSICAL EXAM:  General: In NAD  HEENT: WILBER             Lymphatic system: No cervical LN     Lungs: Decreased BS at the bases, b/l wheezing  Cardiovascular: Regular, no LE edema  Gastrointestinal: Soft.  + BS   Musculoskeletal: No Clubbing.  Full range of motion.. Moves all extremities  Skin: Warm.  Intact  Neurological: No motor or sensory deficit 3

## 2020-01-08 NOTE — PROGRESS NOTE ADULT - SUBJECTIVE AND OBJECTIVE BOX
Patient was seen and examined. Spoke with RN. Chart reviewed.  No events overnight.  Vital Signs Last 24 Hrs  T(F): 97.6 (08 Jan 2020 13:30), Max: 97.6 (08 Jan 2020 13:30)  HR: 89 (08 Jan 2020 13:30) (77 - 94)  BP: 138/64 (08 Jan 2020 13:30) (138/64 - 175/70)  SpO2: 99% (08 Jan 2020 11:40) (98% - 99%)  MEDICATIONS  (STANDING):  ALBUTerol    90 MICROgram(s) HFA Inhaler 1 Puff(s) Inhalation every 4 hours  albuterol/ipratropium for Nebulization 3 milliLiter(s) Nebulizer every 6 hours  amLODIPine   Tablet 5 milliGRAM(s) Oral daily  azithromycin   Tablet 500 milliGRAM(s) Oral daily  budesonide 160 MICROgram(s)/formoterol 4.5 MICROgram(s) Inhaler 2 Puff(s) Inhalation two times a day  chlorhexidine 4% Liquid 1 Application(s) Topical <User Schedule>  enoxaparin Injectable 40 milliGRAM(s) SubCutaneous daily  escitalopram 10 milliGRAM(s) Oral daily  guaifenesin/dextromethorphan  Syrup 10 milliLiter(s) Oral every 4 hours  latanoprost 0.005% Ophthalmic Solution 1 Drop(s) Both EYES at bedtime  melatonin 3 milliGRAM(s) Oral at bedtime  nystatin Cream 1 Application(s) Topical two times a day  tiotropium 18 MICROgram(s) Capsule 1 Capsule(s) Inhalation daily    MEDICATIONS  (PRN):    Labs:                        12.4   17.37 )-----------( 301      ( 08 Jan 2020 06:23 )             39.3                         12.8   13.30 )-----------( 273      ( 07 Jan 2020 06:41 )             40.5     08 Jan 2020 06:23    142    |  102    |  20     ----------------------------<  113    4.1     |  24     |  0.7    07 Jan 2020 06:41    140    |  102    |  19     ----------------------------<  127    4.8     |  26     |  0.7      Ca    9.1        08 Jan 2020 06:23  Ca    9.2        07 Jan 2020 06:41    TPro  6.5    /  Alb  3.8    /  TBili  <0.2   /  DBili  x      /  AST  21     /  ALT  62     /  AlkPhos  96     08 Jan 2020 06:23  TPro  6.7    /  Alb  3.8    /  TBili  0.3    /  DBili  x      /  AST  31     /  ALT  85     /  AlkPhos  95     07 Jan 2020 06:41          General: comfortable, NAD at rest, but dyspnea on exertion  Neurology: A&Ox3, nonfocal  Head:  Normocephalic, atraumatic  ENT:  Mucosa moist, no ulcerations  Neck:  Supple, no JVD,   Skin: no breakdowns (as per RN)  Resp: mild wheezing B/L  CV: RRR, S1S2,   GI: Soft, NT, bowel sounds, obese  MS: No edema, + peripheral pulses, FROM all 4 extremity      A/P:  54  year old woman with history of sarcoidosis, asthma, anxiety, CHARO, pre DM admitted with acute asthma exacerbation secondary to RSV bronchitis    IV steroids as per pulm  bronchodilators  pulm f/u appreciated  OOBTC  abx   ambulate   D/C when cleared by pulm  DVT prophylaxis  Decubitus prevention- all measures as per RN protocol  Please call or text me with any questions or updates

## 2020-01-08 NOTE — PROGRESS NOTE ADULT - ASSESSMENT
54  year old female with history of sarcoidosis, asthma, anxiety, CHARO, pre DM presents with SOB for 4 days duration.    # Acute Asthma exacerbation secondary to RSV bronchitis  # H/O Sarcoidosis  - CXR clear, RSV +, no fever or leukocytosis  - flu negative   - s/p one dose of Levaquin in ED,   - s/p 125 mg solumedrol 5* AMNA/ ipratropium and MG in ED, with improvement. in ED  - started on solumedrol 60mg IV q12 as per pulm  - c/w AMNA Q4,  Symbicort 160mg 2puff q12  - f/u pulm, pssoible d/c tomorrow    # HTN- not on any home medications, if BP remains persistently elevated, will start amlodipine 5mg q24  # H/O CHARO noncompliant with CPAP.- use CPAP at night  # H/O Anxiety c/w home meds     # DASH diet   # DVT PPX LOVENOX  # DISPO acute  # FULL COD 54  year old female with history of sarcoidosis, asthma, anxiety, CHARO, pre DM presents with SOB for 4 days duration.    # Acute Asthma exacerbation secondary to RSV bronchitis  # H/O Sarcoidosis  - CXR clear, RSV +, no fever or leukocytosis  - flu negative   - s/p one dose of Levaquin in ED,   - s/p 125 mg solumedrol 5* AMNA/ ipratropium and MG in ED, with improvement. in ED  - started on solumedrol 60mg IV q12 and added azithromycin 500mg q12 as antiinflammatory as per pulm  - c/w AMNA Q4,  Symbicort 160mg 2puff q12  - f/u pulm, pssoible d/c tomorrow    # HTN- not on any home medications, if BP remains persistently elevated, will start amlodipine 5mg q24  # H/O CHARO noncompliant with CPAP.- use CPAP at night  # H/O Anxiety c/w home meds     # DASH diet   # DVT PPX LOVENOX  # DISPO acute  # FULL COD

## 2020-01-08 NOTE — PROGRESS NOTE ADULT - ASSESSMENT
impression:    Acute asthma exacerbation 2ry to RSV  h/o of sarcoidosis stable last chest ct 11.19  CHARO on CPAP(non-compliant)    Plan:    Check peak flow q shift  Solumedrol 60 q 12h  increased symbicort to 160/ 4.5  azithro 500 q 24 (antiinflammatory effect)  Neb q 4 h  DVT ppx  OOB to chair  ambulate

## 2020-01-09 LAB
ALBUMIN SERPL ELPH-MCNC: 4.1 G/DL — SIGNIFICANT CHANGE UP (ref 3.5–5.2)
ALP SERPL-CCNC: 99 U/L — SIGNIFICANT CHANGE UP (ref 30–115)
ALT FLD-CCNC: 58 U/L — HIGH (ref 0–41)
ANION GAP SERPL CALC-SCNC: 14 MMOL/L — SIGNIFICANT CHANGE UP (ref 7–14)
AST SERPL-CCNC: 22 U/L — SIGNIFICANT CHANGE UP (ref 0–41)
BASOPHILS # BLD AUTO: 0.05 K/UL — SIGNIFICANT CHANGE UP (ref 0–0.2)
BASOPHILS NFR BLD AUTO: 0.3 % — SIGNIFICANT CHANGE UP (ref 0–1)
BILIRUB SERPL-MCNC: 0.2 MG/DL — SIGNIFICANT CHANGE UP (ref 0.2–1.2)
BUN SERPL-MCNC: 19 MG/DL — SIGNIFICANT CHANGE UP (ref 10–20)
CALCIUM SERPL-MCNC: 9.6 MG/DL — SIGNIFICANT CHANGE UP (ref 8.5–10.1)
CHLORIDE SERPL-SCNC: 99 MMOL/L — SIGNIFICANT CHANGE UP (ref 98–110)
CO2 SERPL-SCNC: 27 MMOL/L — SIGNIFICANT CHANGE UP (ref 17–32)
CREAT SERPL-MCNC: 0.8 MG/DL — SIGNIFICANT CHANGE UP (ref 0.7–1.5)
EOSINOPHIL # BLD AUTO: 0 K/UL — SIGNIFICANT CHANGE UP (ref 0–0.7)
EOSINOPHIL NFR BLD AUTO: 0 % — SIGNIFICANT CHANGE UP (ref 0–8)
GLUCOSE BLDC GLUCOMTR-MCNC: 116 MG/DL — HIGH (ref 70–99)
GLUCOSE BLDC GLUCOMTR-MCNC: 120 MG/DL — HIGH (ref 70–99)
GLUCOSE BLDC GLUCOMTR-MCNC: 123 MG/DL — HIGH (ref 70–99)
GLUCOSE BLDC GLUCOMTR-MCNC: 202 MG/DL — HIGH (ref 70–99)
GLUCOSE SERPL-MCNC: 137 MG/DL — HIGH (ref 70–99)
HCT VFR BLD CALC: 41.6 % — SIGNIFICANT CHANGE UP (ref 37–47)
HGB BLD-MCNC: 13.5 G/DL — SIGNIFICANT CHANGE UP (ref 12–16)
IMM GRANULOCYTES NFR BLD AUTO: 5.4 % — HIGH (ref 0.1–0.3)
LYMPHOCYTES # BLD AUTO: 16 % — LOW (ref 20.5–51.1)
LYMPHOCYTES # BLD AUTO: 2.86 K/UL — SIGNIFICANT CHANGE UP (ref 1.2–3.4)
MCHC RBC-ENTMCNC: 30 PG — SIGNIFICANT CHANGE UP (ref 27–31)
MCHC RBC-ENTMCNC: 32.5 G/DL — SIGNIFICANT CHANGE UP (ref 32–37)
MCV RBC AUTO: 92.4 FL — SIGNIFICANT CHANGE UP (ref 81–99)
MONOCYTES # BLD AUTO: 0.68 K/UL — HIGH (ref 0.1–0.6)
MONOCYTES NFR BLD AUTO: 3.8 % — SIGNIFICANT CHANGE UP (ref 1.7–9.3)
NEUTROPHILS # BLD AUTO: 13.31 K/UL — HIGH (ref 1.4–6.5)
NEUTROPHILS NFR BLD AUTO: 74.5 % — SIGNIFICANT CHANGE UP (ref 42.2–75.2)
NRBC # BLD: 0 /100 WBCS — SIGNIFICANT CHANGE UP (ref 0–0)
PLATELET # BLD AUTO: 335 K/UL — SIGNIFICANT CHANGE UP (ref 130–400)
POTASSIUM SERPL-MCNC: 4.7 MMOL/L — SIGNIFICANT CHANGE UP (ref 3.5–5)
POTASSIUM SERPL-SCNC: 4.7 MMOL/L — SIGNIFICANT CHANGE UP (ref 3.5–5)
PROT SERPL-MCNC: 7.1 G/DL — SIGNIFICANT CHANGE UP (ref 6–8)
RBC # BLD: 4.5 M/UL — SIGNIFICANT CHANGE UP (ref 4.2–5.4)
RBC # FLD: 14.6 % — HIGH (ref 11.5–14.5)
SODIUM SERPL-SCNC: 140 MMOL/L — SIGNIFICANT CHANGE UP (ref 135–146)
WBC # BLD: 17.86 K/UL — HIGH (ref 4.8–10.8)
WBC # FLD AUTO: 17.86 K/UL — HIGH (ref 4.8–10.8)

## 2020-01-09 RX ADMIN — Medication 3 MILLILITER(S): at 20:01

## 2020-01-09 RX ADMIN — BUDESONIDE AND FORMOTEROL FUMARATE DIHYDRATE 2 PUFF(S): 160; 4.5 AEROSOL RESPIRATORY (INHALATION) at 07:33

## 2020-01-09 RX ADMIN — AMLODIPINE BESYLATE 5 MILLIGRAM(S): 2.5 TABLET ORAL at 05:37

## 2020-01-09 RX ADMIN — Medication 10 MILLILITER(S): at 05:37

## 2020-01-09 RX ADMIN — Medication 3 MILLILITER(S): at 07:24

## 2020-01-09 RX ADMIN — Medication 10 MILLILITER(S): at 09:28

## 2020-01-09 RX ADMIN — Medication 10 MILLILITER(S): at 17:29

## 2020-01-09 RX ADMIN — BUDESONIDE AND FORMOTEROL FUMARATE DIHYDRATE 2 PUFF(S): 160; 4.5 AEROSOL RESPIRATORY (INHALATION) at 21:07

## 2020-01-09 RX ADMIN — Medication 3 MILLILITER(S): at 13:43

## 2020-01-09 RX ADMIN — AZITHROMYCIN 500 MILLIGRAM(S): 500 TABLET, FILM COATED ORAL at 11:15

## 2020-01-09 RX ADMIN — NYSTATIN CREAM 1 APPLICATION(S): 100000 CREAM TOPICAL at 05:36

## 2020-01-09 RX ADMIN — NYSTATIN CREAM 1 APPLICATION(S): 100000 CREAM TOPICAL at 17:29

## 2020-01-09 RX ADMIN — Medication 60 MILLIGRAM(S): at 17:30

## 2020-01-09 RX ADMIN — ESCITALOPRAM OXALATE 10 MILLIGRAM(S): 10 TABLET, FILM COATED ORAL at 11:15

## 2020-01-09 RX ADMIN — Medication 10 MILLILITER(S): at 21:08

## 2020-01-09 RX ADMIN — Medication 60 MILLIGRAM(S): at 05:37

## 2020-01-09 RX ADMIN — ENOXAPARIN SODIUM 40 MILLIGRAM(S): 100 INJECTION SUBCUTANEOUS at 11:15

## 2020-01-09 RX ADMIN — Medication 10 MILLILITER(S): at 13:13

## 2020-01-09 RX ADMIN — Medication 5 MILLIGRAM(S): at 21:07

## 2020-01-09 RX ADMIN — LATANOPROST 1 DROP(S): 0.05 SOLUTION/ DROPS OPHTHALMIC; TOPICAL at 21:08

## 2020-01-09 NOTE — CONSULT NOTE ADULT - SUBJECTIVE AND OBJECTIVE BOX
HPI:  54  year old female with history of sarcoidosis, asthma, anxiety, CHARO, pre DM presents with SOB for 4 days duration.   patient was doing well until 4 days prior to presentation when symptoms started as post nasal drip that has progressed to wheezing and nonproductive cough. has attacks of cough associated with chest pain and severe SOB   Went to her PMD 2 days prior to presentation who started her on Doxy and prednisone 50 mg which sh received for 2 days with no improvement. Today she complains of runny nose, low appetite, denies chest pain nausea, vomiting, abd pain, or diarrhea.  In ED temp 98.2, hr 95, bp 169/82, rr 18 O2 sat 96 no leukocytosis, in respiratory distress, S/P 125 mg methylprednisolone nebs and MG, showed significant improvement . received one dose of Levaquin in ED for questionable opacity in the RLL.   admitted for asthma exacerbation (02 Jan 2020 17:55)      PAST MEDICAL & SURGICAL HISTORY:  Prediabetes  Sarcoidosis  Asthma      Hospital Course:    TODAY'S SUBJECTIVE & REVIEW OF SYMPTOMS:     Constitutional WNL   Cardio WNL   Resp WNL   GI WNL  Heme WNL  Endo WNL  Skin WNL  MSK WNL  Neuro WNL  Cognitive WNL  Psych WNL      MEDICATIONS  (STANDING):  ALBUTerol    90 MICROgram(s) HFA Inhaler 1 Puff(s) Inhalation every 4 hours  albuterol/ipratropium for Nebulization 3 milliLiter(s) Nebulizer every 6 hours  amLODIPine   Tablet 5 milliGRAM(s) Oral daily  azithromycin   Tablet 500 milliGRAM(s) Oral daily  budesonide 160 MICROgram(s)/formoterol 4.5 MICROgram(s) Inhaler 2 Puff(s) Inhalation two times a day  chlorhexidine 4% Liquid 1 Application(s) Topical <User Schedule>  enoxaparin Injectable 40 milliGRAM(s) SubCutaneous daily  escitalopram 10 milliGRAM(s) Oral daily  guaifenesin/dextromethorphan  Syrup 10 milliLiter(s) Oral every 4 hours  latanoprost 0.005% Ophthalmic Solution 1 Drop(s) Both EYES at bedtime  melatonin 5 milliGRAM(s) Oral at bedtime  methylPREDNISolone sodium succinate Injectable 60 milliGRAM(s) IV Push every 12 hours  nystatin Cream 1 Application(s) Topical two times a day  tiotropium 18 MICROgram(s) Capsule 1 Capsule(s) Inhalation daily    MEDICATIONS  (PRN):  acetaminophen   Tablet .. 650 milliGRAM(s) Oral every 6 hours PRN Moderate Pain (4 - 6)      FAMILY HISTORY:      Allergies    dust (Unknown)  peanuts (Vomiting; Faint; Nausea; Urticaria; Short breath; Headache)  penicillins (Anaphylaxis)  Tree Nuts (Anaphylaxis)    Intolerances        SOCIAL HISTORY:    [  ] Etoh  [  ] Smoking  [  ] Substance abuse     Home Environment:  [  ] Home Alone  [ x ] Lives with Family  [  ] Home Health Aid    Dwelling:  [x  ] Apartment  [  ] Private House  [  ] Adult Home  [  ] Skilled Nursing Facility      [  ] Short Term  [  ] Long Term  [xx  ] Stairs       Elevator [  ]    FUNCTIONAL STATUS PTA: (Check all that apply)  Ambulation: [  x ]Independent    [  ] Dependent     [  ] Non-Ambulatory  Assistive Device: [  ] SA Cane  [  ]  Q Cane  [  ] Walker  [  ]  Wheelchair  ADL : [ x ] Independent  [  ]  Dependent       Vital Signs Last 24 Hrs  T(C): 36.6 (09 Jan 2020 12:08), Max: 36.6 (09 Jan 2020 12:08)  T(F): 97.9 (09 Jan 2020 12:08), Max: 97.9 (09 Jan 2020 12:08)  HR: 101 (09 Jan 2020 12:08) (84 - 101)  BP: 157/70 (09 Jan 2020 12:08) (146/68 - 157/70)  BP(mean): --  RR: 20 (09 Jan 2020 12:08) (18 - 20)  SpO2: 98% (09 Jan 2020 07:34) (98% - 98%)      PHYSICAL EXAM: Alert & Oriented X3  GENERAL: NAD  HEAD:  Atraumatic, Normocephalic  CHEST/LUNG: Clear   HEART: S1S2+  ABDOMEN: Soft, Nontender  EXTREMITIES:  no calf tenderness    NERVOUS SYSTEM:  Cranial Nerves 2-12 intact [  ] Abnormal  [  ]  ROM: WFL all extremities [x  ]  Abnormal [  ]  Motor Strength: WFL all extremities  [ x ]  Abnormal [  ]  Sensation: intact to light touch [ x ] Abnormal [  ]  Reflexes: Symmetric [  ]  Abnormal [  ]    FUNCTIONAL STATUS:  Bed Mobility: Independent [  ]  Supervision [  ]  Needs Assistance [ x ]  N/A [  ]  Transfers: Independent [  ]  Supervision [  ]  Needs Assistance [ x ]  N/A [  ]   Ambulation: Independent [  ]  Supervision [  ]  Needs Assistance [  ]  N/A [  ]  ADL: Independent [  ] Requires Assistance [  ] N/A [  ]      LABS:                        13.5   17.86 )-----------( 335      ( 09 Jan 2020 07:13 )             41.6     01-09    140  |  99  |  19  ----------------------------<  137<H>  4.7   |  27  |  0.8    Ca    9.6      09 Jan 2020 07:13    TPro  7.1  /  Alb  4.1  /  TBili  0.2  /  DBili  x   /  AST  22  /  ALT  58<H>  /  AlkPhos  99  01-09          RADIOLOGY & ADDITIONAL STUDIES:    Assesment:

## 2020-01-09 NOTE — DIETITIAN INITIAL EVALUATION ADULT. - ENERGY NEEDS
calorie: 1798 - 1977 kcals/day (MSJ x 1.0 - 1.1 AF for obesity)  protein: 60 - 70 gms/day (1.2 - 1.4 gm/kg IBW for obesity)  fluid: 1ml/kcal or per LIP

## 2020-01-09 NOTE — PHYSICAL THERAPY INITIAL EVALUATION ADULT - PERTINENT HX OF CURRENT PROBLEM, REHAB EVAL
54  year old female with history of sarcoidosis, asthma, anxiety, CHARO, pre DM presents with SOB for 4 days duration.

## 2020-01-09 NOTE — PHYSICAL THERAPY INITIAL EVALUATION ADULT - ASSISTIVE DEVICE FOR STAIR TRANSFER, REHAB EVAL
right rail up/left rail down/Pt. presented with increased SOB and fatigue after one flight of stairs. Pt. was given therapeutic rest and was able to descend the stairs without SOB

## 2020-01-09 NOTE — PROGRESS NOTE ADULT - ASSESSMENT
impression:    Acute asthma exacerbation 2ry to RSV/ pf 150  h/o of sarcoidosis stable last chest ct 11.19  CHARO on CPAP(non-compliant)    Plan:    Check peak flow q shift  Solumedrol 60 q 12h today   increased symbicort to 160/ 4.5  azithro 500 q 24 (antiinflammatory effect) for 5 days  Neb q 4 h  DVT ppx  OOB to chair  ambulate  attempt dc in am on medrol pack

## 2020-01-09 NOTE — CONSULT NOTE ADULT - ASSESSMENT
IMPRESSION: Rehab of gait dysfunction    PRECAUTIONS: [  ] Cardiac  [  ] Respiratory  [  ] Seizures [  ] Contact Isolation  [  ] Droplet Isolation  [  ] Other    Weight Bearing Status:     RECOMMENDATION:    Out of Bed to Chair     DVT/Decubiti Prophylaxis    REHAB PLAN:     [ x  ] Bedside P/T 3-5 times a week   [   ]   Bedside O/T  2-3 times a week             [   ] No Rehab Therapy Indicated                   [   ]  Speech Therapy   Conditioning/ROM                                    ADL  Bed Mobility                                               Conditioning/ROM  Transfers                                                     Bed Mobility  Sitting /Standing Balance                         Transfers                                        Gait Training                                               Sitting/Standing Balance  Stair Training [   ]Applicable                    Home equipment Eval                                                                        Splinting  [   ] Only      GOALS:   ADL   [   ]   Independent                    Transfers  [  x ] Independent                          Ambulation  [ xx  ] Independent     [x    ] With device                            [   ]  CG                                                         [   ]  CG                                                                  [   ] CG                            [    ] Min A                                                   [   ] Min A                                                              [   ] Min  A          DISCHARGE PLAN:   [   ]  Good candidate for Intensive Rehabilitation/Hospital based                                             Will tolerate 3hrs Intensive Rehab Daily                                       [    ]  Short Term Rehab in Skilled Nursing Facility                                       [x    ]  Home with Outpatient or  services                                         [    ]  Possible Candidate for Intensive Hospital based Rehab

## 2020-01-09 NOTE — DIETITIAN INITIAL EVALUATION ADULT. - PHYSICAL APPEARANCE
BMI 50.3  IBW: 110# Denies changes in her body weight. ecchymosis, skin intact. No physical signs of malnutrition./obese

## 2020-01-09 NOTE — PHYSICAL THERAPY INITIAL EVALUATION ADULT - THERAPY FREQUENCY, PT EVAL
Pt. is functioning at her height level of function and is independent in all aspects. Pt. does not require bedside PT services at this time.

## 2020-01-09 NOTE — PROGRESS NOTE ADULT - SUBJECTIVE AND OBJECTIVE BOX
Patient was seen and examined. Spoke with RN. Chart reviewed.  No events overnight.  Vital Signs Last 24 Hrs  T(F): 96.4 (09 Jan 2020 04:56), Max: 97.6 (08 Jan 2020 13:30)  HR: 84 (09 Jan 2020 04:56) (84 - 95)  BP: 153/86 (09 Jan 2020 04:56) (138/64 - 153/86)  SpO2: 98% (09 Jan 2020 07:34) (98% - 99%)  MEDICATIONS  (STANDING):  ALBUTerol    90 MICROgram(s) HFA Inhaler 1 Puff(s) Inhalation every 4 hours  albuterol/ipratropium for Nebulization 3 milliLiter(s) Nebulizer every 6 hours  amLODIPine   Tablet 5 milliGRAM(s) Oral daily  azithromycin   Tablet 500 milliGRAM(s) Oral daily  budesonide 160 MICROgram(s)/formoterol 4.5 MICROgram(s) Inhaler 2 Puff(s) Inhalation two times a day  chlorhexidine 4% Liquid 1 Application(s) Topical <User Schedule>  enoxaparin Injectable 40 milliGRAM(s) SubCutaneous daily  escitalopram 10 milliGRAM(s) Oral daily  guaifenesin/dextromethorphan  Syrup 10 milliLiter(s) Oral every 4 hours  latanoprost 0.005% Ophthalmic Solution 1 Drop(s) Both EYES at bedtime  melatonin 5 milliGRAM(s) Oral at bedtime  methylPREDNISolone sodium succinate Injectable 60 milliGRAM(s) IV Push every 12 hours  nystatin Cream 1 Application(s) Topical two times a day  tiotropium 18 MICROgram(s) Capsule 1 Capsule(s) Inhalation daily    MEDICATIONS  (PRN):  acetaminophen   Tablet .. 650 milliGRAM(s) Oral every 6 hours PRN Moderate Pain (4 - 6)    Labs:                        13.5   17.86 )-----------( 335      ( 09 Jan 2020 07:13 )             41.6                         12.4   17.37 )-----------( 301      ( 08 Jan 2020 06:23 )             39.3     09 Jan 2020 07:13    140    |  99     |  19     ----------------------------<  137    4.7     |  27     |  0.8    08 Jan 2020 06:23    142    |  102    |  20     ----------------------------<  113    4.1     |  24     |  0.7      Ca    9.6        09 Jan 2020 07:13  Ca    9.1        08 Jan 2020 06:23    TPro  7.1    /  Alb  4.1    /  TBili  0.2    /  DBili  x      /  AST  22     /  ALT  58     /  AlkPhos  99     09 Jan 2020 07:13  TPro  6.5    /  Alb  3.8    /  TBili  <0.2   /  DBili  x      /  AST  21     /  ALT  62     /  AlkPhos  96     08 Jan 2020 06:23          General: comfortable, NAD  Neurology: A&Ox3, nonfocal  Head:  Normocephalic, atraumatic  ENT:  Mucosa moist, no ulcerations  Neck:  Supple, no JVD,   Skin: no breakdowns (as per RN)  Resp: wheezes, improved   CV: RRR, S1S2,   GI: Soft, NT, bowel sounds  MS: No edema, + peripheral pulses, FROM all 4 extremity      A/P:  54  year old woman with history of sarcoidosis, asthma, anxiety, CHARO, pre DM admitted with acute asthma exacerbation secondary to RSV bronchitis    IV steroids as per pulm  bronchodilators  pulm following   OOBTC  abx   ambulate  Medrol pack upon d/c    D/C when cleared by pulm, anticipate 24 hours   DVT prophylaxis  Decubitus prevention- all measures as per RN protocol  Please call or text me with any questions or updates

## 2020-01-09 NOTE — PROGRESS NOTE ADULT - SUBJECTIVE AND OBJECTIVE BOX
SUBJECTIVE:    Patient is a 54y old Female who presents with a chief complaint of SOB (09 Jan 2020 07:17)    Currently admitted to medicine with the primary diagnosis of Shortness of breath     Today is hospital day 7d. This morning she is resting comfortably in bed and reports no new issues or overnight events.     PAST MEDICAL & SURGICAL HISTORY  Prediabetes  Sarcoidosis  Asthma    SOCIAL HISTORY:  Negative for smoking/alcohol/drug use.     ALLERGIES:  dust (Unknown)  peanuts (Vomiting; Faint; Nausea; Urticaria; Short breath; Headache)  penicillins (Anaphylaxis)    MEDICATIONS:  STANDING MEDICATIONS  ALBUTerol    90 MICROgram(s) HFA Inhaler 1 Puff(s) Inhalation every 4 hours  albuterol/ipratropium for Nebulization 3 milliLiter(s) Nebulizer every 6 hours  amLODIPine   Tablet 5 milliGRAM(s) Oral daily  azithromycin   Tablet 500 milliGRAM(s) Oral daily  budesonide 160 MICROgram(s)/formoterol 4.5 MICROgram(s) Inhaler 2 Puff(s) Inhalation two times a day  chlorhexidine 4% Liquid 1 Application(s) Topical <User Schedule>  enoxaparin Injectable 40 milliGRAM(s) SubCutaneous daily  escitalopram 10 milliGRAM(s) Oral daily  guaifenesin/dextromethorphan  Syrup 10 milliLiter(s) Oral every 4 hours  latanoprost 0.005% Ophthalmic Solution 1 Drop(s) Both EYES at bedtime  melatonin 5 milliGRAM(s) Oral at bedtime  methylPREDNISolone sodium succinate Injectable 60 milliGRAM(s) IV Push every 12 hours  nystatin Cream 1 Application(s) Topical two times a day  tiotropium 18 MICROgram(s) Capsule 1 Capsule(s) Inhalation daily    PRN MEDICATIONS  acetaminophen   Tablet .. 650 milliGRAM(s) Oral every 6 hours PRN    VITALS:   T(F): 96.4  HR: 84  BP: 153/86  RR: 18  SpO2: 98%    LABS:                        12.4   17.37 )-----------( 301      ( 08 Jan 2020 06:23 )             39.3     01-08    142  |  102  |  20  ----------------------------<  113<H>  4.1   |  24  |  0.7    Ca    9.1      08 Jan 2020 06:23    TPro  6.5  /  Alb  3.8  /  TBili  <0.2  /  DBili  x   /  AST  21  /  ALT  62<H>  /  AlkPhos  96  01-08                  RADIOLOGY:  < from: Xray Chest 1 View- PORTABLE-Routine (01.07.20 @ 06:38) >    Impression:      Stable mild pulmonary vascular congestion.    < end of copied text >    PHYSICAL EXAM:  General: In NAD  HEENT: WILBER             Lymphatic system: No cervical LN     Lungs: Decreased BS at the bases, b/l wheezing  Cardiovascular: Regular, no LE edema  Gastrointestinal: Soft.  + BS   Musculoskeletal: No Clubbing.  Full range of motion.. Moves all extremities  Skin: Warm.  Intact  Neurological: No motor or sensory deficit 3

## 2020-01-09 NOTE — DIETITIAN INITIAL EVALUATION ADULT. - FACTORS AFF FOOD INTAKE
none/Pt reports great appetite since adm. She enjoys the food because it does not make her breathing more difficult (which usually did PTA). Usually finishes 100% of meal trays. Reports allergy to peanuts, and tree nuts. Denies GI distress. Last BM 1/2 per EMR. Denies chewing/swallowing difficulty. Does not take nutrition supplements at home.

## 2020-01-09 NOTE — PROGRESS NOTE ADULT - SUBJECTIVE AND OBJECTIVE BOX
OVERNIGHT EVENTS: feels better, still wheezing    Vital Signs Last 24 Hrs  T(C): 35.8 (09 Jan 2020 04:56), Max: 36.4 (08 Jan 2020 13:30)  T(F): 96.4 (09 Jan 2020 04:56), Max: 97.6 (08 Jan 2020 13:30)  HR: 84 (09 Jan 2020 04:56) (84 - 95)  BP: 153/86 (09 Jan 2020 04:56) (138/64 - 153/86)  BP(mean): --  RR: 18 (09 Jan 2020 04:56) (18 - 20)  SpO2: 99% (08 Jan 2020 11:40) (98% - 99%)    PHYSICAL EXAMINATION:    GENERAL: The patient is awake and alert in no apparent distress.     HEENT: Head is normocephalic and atraumatic. Extraocular muscles are intact. Mucous membranes are moist.    NECK: Supple.    LUNGS: wheezing improved    HEART: Regular rate and rhythm without murmur.    ABDOMEN: Soft, nontender, and nondistended.      EXTREMITIES: Without any cyanosis, clubbing, rash, lesions or edema.    NEUROLOGIC: Grossly intact.    SKIN: No ulceration or induration present.      LABS:                        12.4   17.37 )-----------( 301      ( 08 Jan 2020 06:23 )             39.3     01-08    142  |  102  |  20  ----------------------------<  113<H>  4.1   |  24  |  0.7    Ca    9.1      08 Jan 2020 06:23    TPro  6.5  /  Alb  3.8  /  TBili  <0.2  /  DBili  x   /  AST  21  /  ALT  62<H>  /  AlkPhos  96  01-08                            MICROBIOLOGY:      MEDICATIONS  (STANDING):  ALBUTerol    90 MICROgram(s) HFA Inhaler 1 Puff(s) Inhalation every 4 hours  albuterol/ipratropium for Nebulization 3 milliLiter(s) Nebulizer every 6 hours  amLODIPine   Tablet 5 milliGRAM(s) Oral daily  azithromycin   Tablet 500 milliGRAM(s) Oral daily  budesonide 160 MICROgram(s)/formoterol 4.5 MICROgram(s) Inhaler 2 Puff(s) Inhalation two times a day  chlorhexidine 4% Liquid 1 Application(s) Topical <User Schedule>  enoxaparin Injectable 40 milliGRAM(s) SubCutaneous daily  escitalopram 10 milliGRAM(s) Oral daily  guaifenesin/dextromethorphan  Syrup 10 milliLiter(s) Oral every 4 hours  latanoprost 0.005% Ophthalmic Solution 1 Drop(s) Both EYES at bedtime  melatonin 5 milliGRAM(s) Oral at bedtime  methylPREDNISolone sodium succinate Injectable 60 milliGRAM(s) IV Push every 12 hours  nystatin Cream 1 Application(s) Topical two times a day  tiotropium 18 MICROgram(s) Capsule 1 Capsule(s) Inhalation daily    MEDICATIONS  (PRN):  acetaminophen   Tablet .. 650 milliGRAM(s) Oral every 6 hours PRN Moderate Pain (4 - 6)      RADIOLOGY & ADDITIONAL STUDIES:

## 2020-01-09 NOTE — DIETITIAN INITIAL EVALUATION ADULT. - OTHER INFO
Pt adm for SOB. Acute Asthma exacerbation secondary to RSV bronchitis. H/O Sarcoidosis. HTN. CHARO noncompliant with CPAP. Pulmonary following.

## 2020-01-09 NOTE — PROGRESS NOTE ADULT - ASSESSMENT
54  year old female with history of sarcoidosis, asthma, anxiety, CHARO, pre DM presents with SOB for 4 days duration.    # Acute Asthma exacerbation secondary to RSV bronchitis  # H/O Sarcoidosis  - CXR clear, RSV +, no fever or leukocytosis  - flu negative   - s/p one dose of Levaquin in ED,   - s/p 125 mg solumedrol 5* AMNA/ ipratropium and MG in ED, with improvement. in ED  - started on solumedrol 60mg IV q12 and added azithromycin 500mg q12 as antiinflammatory as per pulm  - c/w AMNA Q4,  Symbicort 160mg 2puff q12  - f/u pulm, possible d/c tomorrow on medrol pack as per pulm.    # HTN- not on any home medications, if BP remains persistently elevated, will start amlodipine 5mg q24  # H/O CHARO noncompliant with CPAP.- use CPAP at night  # H/O Anxiety c/w home meds     # DASH diet   # DVT PPX LOVENOX  # DISPO acute  # FULL COD

## 2020-01-10 ENCOUNTER — TRANSCRIPTION ENCOUNTER (OUTPATIENT)
Age: 55
End: 2020-01-10

## 2020-01-10 VITALS
TEMPERATURE: 98 F | SYSTOLIC BLOOD PRESSURE: 144 MMHG | HEART RATE: 87 BPM | RESPIRATION RATE: 18 BRPM | DIASTOLIC BLOOD PRESSURE: 75 MMHG

## 2020-01-10 LAB
ALBUMIN SERPL ELPH-MCNC: 3.8 G/DL — SIGNIFICANT CHANGE UP (ref 3.5–5.2)
ALP SERPL-CCNC: 88 U/L — SIGNIFICANT CHANGE UP (ref 30–115)
ALT FLD-CCNC: 49 U/L — HIGH (ref 0–41)
ANION GAP SERPL CALC-SCNC: 11 MMOL/L — SIGNIFICANT CHANGE UP (ref 7–14)
AST SERPL-CCNC: 19 U/L — SIGNIFICANT CHANGE UP (ref 0–41)
BASOPHILS # BLD AUTO: 0.05 K/UL — SIGNIFICANT CHANGE UP (ref 0–0.2)
BASOPHILS NFR BLD AUTO: 0.3 % — SIGNIFICANT CHANGE UP (ref 0–1)
BILIRUB SERPL-MCNC: 0.2 MG/DL — SIGNIFICANT CHANGE UP (ref 0.2–1.2)
BUN SERPL-MCNC: 17 MG/DL — SIGNIFICANT CHANGE UP (ref 10–20)
CALCIUM SERPL-MCNC: 9.4 MG/DL — SIGNIFICANT CHANGE UP (ref 8.5–10.1)
CHLORIDE SERPL-SCNC: 103 MMOL/L — SIGNIFICANT CHANGE UP (ref 98–110)
CO2 SERPL-SCNC: 28 MMOL/L — SIGNIFICANT CHANGE UP (ref 17–32)
CREAT SERPL-MCNC: 0.8 MG/DL — SIGNIFICANT CHANGE UP (ref 0.7–1.5)
EOSINOPHIL # BLD AUTO: 0 K/UL — SIGNIFICANT CHANGE UP (ref 0–0.7)
EOSINOPHIL NFR BLD AUTO: 0 % — SIGNIFICANT CHANGE UP (ref 0–8)
GLUCOSE BLDC GLUCOMTR-MCNC: 119 MG/DL — HIGH (ref 70–99)
GLUCOSE BLDC GLUCOMTR-MCNC: 166 MG/DL — HIGH (ref 70–99)
GLUCOSE SERPL-MCNC: 130 MG/DL — HIGH (ref 70–99)
HCT VFR BLD CALC: 40.3 % — SIGNIFICANT CHANGE UP (ref 37–47)
HGB BLD-MCNC: 12.6 G/DL — SIGNIFICANT CHANGE UP (ref 12–16)
IMM GRANULOCYTES NFR BLD AUTO: 5.2 % — HIGH (ref 0.1–0.3)
LYMPHOCYTES # BLD AUTO: 15.7 % — LOW (ref 20.5–51.1)
LYMPHOCYTES # BLD AUTO: 2.94 K/UL — SIGNIFICANT CHANGE UP (ref 1.2–3.4)
MCHC RBC-ENTMCNC: 28.9 PG — SIGNIFICANT CHANGE UP (ref 27–31)
MCHC RBC-ENTMCNC: 31.3 G/DL — LOW (ref 32–37)
MCV RBC AUTO: 92.4 FL — SIGNIFICANT CHANGE UP (ref 81–99)
MONOCYTES # BLD AUTO: 1.1 K/UL — HIGH (ref 0.1–0.6)
MONOCYTES NFR BLD AUTO: 5.9 % — SIGNIFICANT CHANGE UP (ref 1.7–9.3)
NEUTROPHILS # BLD AUTO: 13.66 K/UL — HIGH (ref 1.4–6.5)
NEUTROPHILS NFR BLD AUTO: 72.9 % — SIGNIFICANT CHANGE UP (ref 42.2–75.2)
NRBC # BLD: 0 /100 WBCS — SIGNIFICANT CHANGE UP (ref 0–0)
PLATELET # BLD AUTO: 316 K/UL — SIGNIFICANT CHANGE UP (ref 130–400)
POTASSIUM SERPL-MCNC: 4.7 MMOL/L — SIGNIFICANT CHANGE UP (ref 3.5–5)
POTASSIUM SERPL-SCNC: 4.7 MMOL/L — SIGNIFICANT CHANGE UP (ref 3.5–5)
PROT SERPL-MCNC: 6.4 G/DL — SIGNIFICANT CHANGE UP (ref 6–8)
RBC # BLD: 4.36 M/UL — SIGNIFICANT CHANGE UP (ref 4.2–5.4)
RBC # FLD: 14.8 % — HIGH (ref 11.5–14.5)
SODIUM SERPL-SCNC: 142 MMOL/L — SIGNIFICANT CHANGE UP (ref 135–146)
WBC # BLD: 18.73 K/UL — HIGH (ref 4.8–10.8)
WBC # FLD AUTO: 18.73 K/UL — HIGH (ref 4.8–10.8)

## 2020-01-10 RX ORDER — AZITHROMYCIN 500 MG/1
1 TABLET, FILM COATED ORAL
Qty: 2 | Refills: 0
Start: 2020-01-10 | End: 2020-01-11

## 2020-01-10 RX ORDER — AMLODIPINE BESYLATE 2.5 MG/1
1 TABLET ORAL
Qty: 30 | Refills: 0
Start: 2020-01-10 | End: 2020-02-08

## 2020-01-10 RX ORDER — IPRATROPIUM/ALBUTEROL SULFATE 18-103MCG
3 AEROSOL WITH ADAPTER (GRAM) INHALATION
Qty: 0 | Refills: 0 | DISCHARGE
Start: 2020-01-10

## 2020-01-10 RX ADMIN — ENOXAPARIN SODIUM 40 MILLIGRAM(S): 100 INJECTION SUBCUTANEOUS at 11:56

## 2020-01-10 RX ADMIN — Medication 10 MILLILITER(S): at 11:56

## 2020-01-10 RX ADMIN — ESCITALOPRAM OXALATE 10 MILLIGRAM(S): 10 TABLET, FILM COATED ORAL at 11:56

## 2020-01-10 RX ADMIN — Medication 10 MILLILITER(S): at 05:29

## 2020-01-10 RX ADMIN — NYSTATIN CREAM 1 APPLICATION(S): 100000 CREAM TOPICAL at 05:29

## 2020-01-10 RX ADMIN — Medication 60 MILLIGRAM(S): at 05:30

## 2020-01-10 RX ADMIN — Medication 3 MILLILITER(S): at 13:14

## 2020-01-10 RX ADMIN — BUDESONIDE AND FORMOTEROL FUMARATE DIHYDRATE 2 PUFF(S): 160; 4.5 AEROSOL RESPIRATORY (INHALATION) at 07:42

## 2020-01-10 RX ADMIN — Medication 3 MILLILITER(S): at 08:03

## 2020-01-10 RX ADMIN — AZITHROMYCIN 500 MILLIGRAM(S): 500 TABLET, FILM COATED ORAL at 11:56

## 2020-01-10 RX ADMIN — AMLODIPINE BESYLATE 5 MILLIGRAM(S): 2.5 TABLET ORAL at 05:29

## 2020-01-10 NOTE — DISCHARGE NOTE NURSING/CASE MANAGEMENT/SOCIAL WORK - NSDCPEWEB_GEN_ALL_CORE
[place] website --- [email or web address] Tobacco [email or web address] website --- [email or web address]

## 2020-01-10 NOTE — PROGRESS NOTE ADULT - SUBJECTIVE AND OBJECTIVE BOX
Patient was seen and examined. Spoke with RN. Chart reviewed.  No events overnight.  Vital Signs Last 24 Hrs  T(F): 96.8 (10 Edmond 2020 04:53), Max: 98.7 (09 Jan 2020 21:42)  HR: 84 (10 Edmond 2020 06:35) (62 - 101)  BP: 147/74 (10 Edmond 2020 06:35) (147/74 - 177/99)  SpO2: 94% (10 Edmond 2020 04:53) (94% - 94%)  MEDICATIONS  (STANDING):  ALBUTerol    90 MICROgram(s) HFA Inhaler 1 Puff(s) Inhalation every 4 hours  albuterol/ipratropium for Nebulization 3 milliLiter(s) Nebulizer every 6 hours  amLODIPine   Tablet 5 milliGRAM(s) Oral daily  azithromycin   Tablet 500 milliGRAM(s) Oral daily  budesonide 160 MICROgram(s)/formoterol 4.5 MICROgram(s) Inhaler 2 Puff(s) Inhalation two times a day  chlorhexidine 4% Liquid 1 Application(s) Topical <User Schedule>  enoxaparin Injectable 40 milliGRAM(s) SubCutaneous daily  escitalopram 10 milliGRAM(s) Oral daily  guaifenesin/dextromethorphan  Syrup 10 milliLiter(s) Oral every 4 hours  latanoprost 0.005% Ophthalmic Solution 1 Drop(s) Both EYES at bedtime  melatonin 5 milliGRAM(s) Oral at bedtime  methylPREDNISolone sodium succinate Injectable 60 milliGRAM(s) IV Push every 12 hours  nystatin Cream 1 Application(s) Topical two times a day  tiotropium 18 MICROgram(s) Capsule 1 Capsule(s) Inhalation daily    MEDICATIONS  (PRN):  acetaminophen   Tablet .. 650 milliGRAM(s) Oral every 6 hours PRN Moderate Pain (4 - 6)    Labs:                        12.6   18.73 )-----------( 316      ( 10 Edmond 2020 06:46 )             40.3                         13.5   17.86 )-----------( 335      ( 09 Jan 2020 07:13 )             41.6     10 Edmond 2020 06:46    142    |  103    |  17     ----------------------------<  130    4.7     |  28     |  0.8    09 Jan 2020 07:13    140    |  99     |  19     ----------------------------<  137    4.7     |  27     |  0.8      Ca    9.4        10 Edmond 2020 06:46  Ca    9.6        09 Jan 2020 07:13    TPro  6.4    /  Alb  3.8    /  TBili  0.2    /  DBili  x      /  AST  19     /  ALT  49     /  AlkPhos  88     10 Edmond 2020 06:46  TPro  7.1    /  Alb  4.1    /  TBili  0.2    /  DBili  x      /  AST  22     /  ALT  58     /  AlkPhos  99     09 Jan 2020 07:13          General: comfortable, NAD  Neurology: A&Ox3, nonfocal  Head:  Normocephalic, atraumatic  ENT:  Mucosa moist, no ulcerations  Neck:  Supple, no JVD,   Skin: no breakdowns (as per RN)  Resp: wheezes, improved   CV: RRR, S1S2,   GI: Soft, NT, bowel sounds  MS: No edema, + peripheral pulses, FROM all 4 extremity      A/P:  54  year old woman with history of sarcoidosis, asthma, anxiety, CHARO, pre DM admitted with acute asthma exacerbation secondary to RSV bronchitis    bronchodilators  pulm following   OOBTC  abx   ambulate  Medrol pack upon d/c    D/C today   will need work note till seen by her pulmonologist for clearance next Friday  DVT prophylaxis  Decubitus prevention- all measures as per RN protocol  Please call or text me with any questions or updates

## 2020-01-10 NOTE — CHART NOTE - NSCHARTNOTEFT_GEN_A_CORE
<<<RESIDENT DISCHARGE NOTE>>>     JAMILAH BRICENO  MRN-[id number]    VITAL SIGNS:  T(F): 96.8 (01-10-20 @ 04:53), Max: 98.7 (01-09-20 @ 21:42)  HR: 84 (01-10-20 @ 06:35)  BP: 147/74 (01-10-20 @ 06:35)  SpO2: 94% (01-10-20 @ 04:53)      PHYSICAL EXAMINATION:  General: In NAD  HEENT: WILBER             Lymphatic system: No cervical LN     Lungs: Decreased BS at the bases, b/l improved wheezing  Cardiovascular: Regular, no LE edema  Gastrointestinal: Soft.  + BS   Musculoskeletal: No Clubbing.  Full range of motion.. Moves all extremities  Skin: Warm.  Intact  Neurological: No motor or sensory deficit 3:    TEST RESULTS:                        12.6   18.73 )-----------( 316      ( 10 Edmond 2020 06:46 )             40.3       01-10    142  |  103  |  17  ----------------------------<  130<H>  4.7   |  28  |  0.8    Ca    9.4      10 Edmond 2020 06:46    TPro  6.4  /  Alb  3.8  /  TBili  0.2  /  DBili  x   /  AST  19  /  ALT  49<H>  /  AlkPhos  88  01-10      FINAL DISCHARGE INTERVIEW:  Resident(s) Present: (Name:_Dr. Flores_), RN Present: (Name:  Dayami)    DISCHARGE MEDICATION RECONCILIATION  reviewed with Attending (Name:Petros Gilbert)    DISPOSITION:   [ * ] Home,    [  ] Home with Visiting Nursing Services,   [    ]  SNF/ NH,    [   ] Acute Rehab (4A),   [   ] Other (Specify:_________)

## 2020-01-10 NOTE — CHART NOTE - NSCHARTNOTEFT_GEN_A_CORE
Mrs Vora known to have sarcoidosis and asthma disease needs a nebulizer machine for treatment of severe asthma to be used for Duoneb 3 cc every 6 hours

## 2020-01-10 NOTE — DISCHARGE NOTE PROVIDER - CARE PROVIDER_API CALL
Rose Mary Davila)  Internal Medicine  [address] [place] [address]  Phone: [phone]  Fax: [phone]  Follow Up Time:     Rod Enrique)  Critical Care Medicine; Internal Medicine; Pulmonary Disease; Sleep Medicine  [address] [place] [address]  Phone: [phone]  Fax: [phone]  Follow Up Time:

## 2020-01-10 NOTE — PROGRESS NOTE ADULT - ASSESSMENT
impression:    Acute asthma exacerbation 2ry to RSV/ pf improved  h/o of sarcoidosis stable last chest ct 11.19  HCARO on CPAP(non-compliant)    Plan:    symbicort to 160/ 4.5 ( at home can take her advair)  azithro 500 q 24 (antiinflammatory effect) for 5 days  Neb q 4 h  DVT ppx  OOB to chair  ambulate  medrol pack on dc  OP f/up

## 2020-01-10 NOTE — DISCHARGE NOTE NURSING/CASE MANAGEMENT/SOCIAL WORK - PATIENT PORTAL LINK FT
You can access the FollowMyHealth Patient Portal offered by [facility] by registering at the following website: [email or web address]. By joining [employer]’s FollowMyHealth portal, you will also be able to view your health information using other applications (apps) compatible with our system.

## 2020-01-10 NOTE — PROGRESS NOTE ADULT - SUBJECTIVE AND OBJECTIVE BOX
OVERNIGHT EVENTS: events noted, feels better,  CC, LE doppler neg    Vital Signs Last 24 Hrs  T(C): 36 (10 Edmond 2020 04:53), Max: 37.1 (09 Jan 2020 21:42)  T(F): 96.8 (10 Edmond 2020 04:53), Max: 98.7 (09 Jan 2020 21:42)  HR: 84 (10 Edmond 2020 06:35) (62 - 101)  BP: 147/74 (10 Edmond 2020 06:35) (147/74 - 177/99)  RR: 18 (10 Edmond 2020 04:53) (18 - 20)  SpO2: 94% (10 Edmond 2020 04:53) (94% - 94%)    PHYSICAL EXAMINATION:    GENERAL: The patient is awake and alert in no apparent distress.     HEENT: Head is normocephalic and atraumatic. Extraocular muscles are intact. Mucous membranes are moist.    NECK: Supple.    LUNGS: improved wheezing    HEART: Regular rate and rhythm without murmur.    ABDOMEN: Soft, nontender, and nondistended.      EXTREMITIES: Without any cyanosis, clubbing, rash, lesions or edema.    NEUROLOGIC: Grossly intact.    SKIN: No ulceration or induration present.      LABS:                        12.6   18.73 )-----------( 316      ( 10 Edmond 2020 06:46 )             40.3     01-09    140  |  99  |  19  ----------------------------<  137<H>  4.7   |  27  |  0.8    Ca    9.6      09 Jan 2020 07:13    TPro  7.1  /  Alb  4.1  /  TBili  0.2  /  DBili  x   /  AST  22  /  ALT  58<H>  /  AlkPhos  99  01-09                            MICROBIOLOGY:      MEDICATIONS  (STANDING):  ALBUTerol    90 MICROgram(s) HFA Inhaler 1 Puff(s) Inhalation every 4 hours  albuterol/ipratropium for Nebulization 3 milliLiter(s) Nebulizer every 6 hours  amLODIPine   Tablet 5 milliGRAM(s) Oral daily  azithromycin   Tablet 500 milliGRAM(s) Oral daily  budesonide 160 MICROgram(s)/formoterol 4.5 MICROgram(s) Inhaler 2 Puff(s) Inhalation two times a day  chlorhexidine 4% Liquid 1 Application(s) Topical <User Schedule>  enoxaparin Injectable 40 milliGRAM(s) SubCutaneous daily  escitalopram 10 milliGRAM(s) Oral daily  guaifenesin/dextromethorphan  Syrup 10 milliLiter(s) Oral every 4 hours  latanoprost 0.005% Ophthalmic Solution 1 Drop(s) Both EYES at bedtime  melatonin 5 milliGRAM(s) Oral at bedtime  methylPREDNISolone sodium succinate Injectable 60 milliGRAM(s) IV Push every 12 hours  nystatin Cream 1 Application(s) Topical two times a day  tiotropium 18 MICROgram(s) Capsule 1 Capsule(s) Inhalation daily    MEDICATIONS  (PRN):  acetaminophen   Tablet .. 650 milliGRAM(s) Oral every 6 hours PRN Moderate Pain (4 - 6)      RADIOLOGY & ADDITIONAL STUDIES:

## 2020-01-10 NOTE — DISCHARGE NOTE NURSING/CASE MANAGEMENT/SOCIAL WORK - NSDCPEEMAIL_GEN_ALL_CORE
[facility] for Tobacco Control email [email or web address]

## 2020-01-10 NOTE — DISCHARGE NOTE PROVIDER - NSDCCPCAREPLAN_GEN_ALL_CORE_FT
PRINCIPAL DISCHARGE DIAGNOSIS  Diagnosis: Shortness of breath  Assessment and Plan of Treatment: You came in here because of difficulty breathing due to asthma exacerbation. We treated you with bronchodilators and steroids. Plese follow up with your PMD and lung doctor as out patient. Also be compliant with your medications.      SECONDARY DISCHARGE DIAGNOSES  Diagnosis: Sarcoidosis  Assessment and Plan of Treatment: follow up with lung doctor    Diagnosis: Hypertension  Assessment and Plan of Treatment: You have high blood pressure. Please follow up with your PMD as out patient for follow up, also steroids can cause elevated BP. Please monitor BP everyday and follow up with your PMD

## 2020-01-10 NOTE — DISCHARGE NOTE PROVIDER - HOSPITAL COURSE
54  year old female with history of sarcoidosis, asthma, anxiety, CHARO, pre DM presents with SOB for 4 days duration.     patient was doing well until 4 days prior to presentation when symptoms started as post nasal drip that has progressed to wheezing and nonproductive cough. has attacks of cough associated with chest pain and severe SOB     Went to her PMD 2 days prior to presentation who started her on Doxy and prednisone 50 mg which sh received for 2 days with no improvement. Today she complains of runny nose, low appetite, denies chest pain nausea, vomiting, abd pain, or diarrhea.    In ED temp 98.2, hr 95, bp 169/82, rr 18 O2 sat 96 no leukocytosis, in respiratory distress, S/P 125 mg methylprednisolone nebs and MG, showed significant improvement . received one dose of Levaquin in ED for questionable opacity in the RLL.     admitted for asthma exacerbation.         Pt was treated on IV steroids, will be discharged home on medrol pack, and nebulizers with out patient follow up with pulmonology.

## 2020-01-15 DIAGNOSIS — F41.9 ANXIETY DISORDER, UNSPECIFIED: ICD-10-CM

## 2020-01-15 DIAGNOSIS — F17.210 NICOTINE DEPENDENCE, CIGARETTES, UNCOMPLICATED: ICD-10-CM

## 2020-01-15 DIAGNOSIS — J45.901 UNSPECIFIED ASTHMA WITH (ACUTE) EXACERBATION: ICD-10-CM

## 2020-01-15 DIAGNOSIS — J20.8 ACUTE BRONCHITIS DUE TO OTHER SPECIFIED ORGANISMS: ICD-10-CM

## 2020-01-15 DIAGNOSIS — I10 ESSENTIAL (PRIMARY) HYPERTENSION: ICD-10-CM

## 2020-01-15 DIAGNOSIS — D86.9 SARCOIDOSIS, UNSPECIFIED: ICD-10-CM

## 2020-01-15 DIAGNOSIS — Z91.19 PATIENT'S NONCOMPLIANCE WITH OTHER MEDICAL TREATMENT AND REGIMEN: ICD-10-CM

## 2020-01-15 DIAGNOSIS — G47.33 OBSTRUCTIVE SLEEP APNEA (ADULT) (PEDIATRIC): ICD-10-CM

## 2020-01-15 DIAGNOSIS — R73.03 PREDIABETES: ICD-10-CM

## 2020-01-15 DIAGNOSIS — B97.4 RESPIRATORY SYNCYTIAL VIRUS AS THE CAUSE OF DISEASES CLASSIFIED ELSEWHERE: ICD-10-CM

## 2020-01-19 NOTE — PATIENT PROFILE ADULT - FUNCTIONAL SCREEN CURRENT LEVEL: BATHING, MLM
Echo 1/15/20 - improving right heart strain.   Hemodynamically stable. Continue heparin drip. 2 = assistive person

## 2020-06-15 NOTE — DISCHARGE NOTE PROVIDER - NSDCMRMEDTOKEN_GEN_ALL_CORE_FT
Care Coordinator - Discharge Planning    Admission Date/Time:  6/5/2020  Attending MD:  Lavon Fair MD     Data  Date of initial CC assessment:  6/9/2020  Chart reviewed, discussed with interdisciplinary team.   Patient was admitted for:   1. Intellectual delay    2. Somnolence    3. Generalized muscle weakness    4. COVID-19 virus not detected         Assessment   Full assessment completed in previous note    Coordination of Care and Referrals:   Writer received 8 calls and 1 voicemail from patient's sister, Awa Tesfaye. Per discussion with the primary provider, permission has been granted w/patient's guardian to give updates to patient's sisters, Awa and Sabra. Call returned to Awa at this time, voicemail left. RNCC will continue to follow for discharge planning.       Plan  Anticipated Discharge Date:  TBD  Anticipated Discharge Plan:  TBD    Gwen Green, RNCC, BSN    [facility]  [address] [place] [id number]    [email or web address]    Office: [phone] Pager: [phone]  To contact the weekend RNCC, page [phone].          Advair Diskus 500 mcg-50 mcg inhalation powder: 2 puff(s) inhaled once a day  ALPRAZolam 0.5 mg oral tablet: 1 tab(s) orally 3 times a day, As Needed  amLODIPine 5 mg oral tablet: 1 tab(s) orally once a day  azithromycin 500 mg oral tablet: 1 tab(s) orally once a day  EPINEPHrine 0.3 mg injectable kit: 0.3 milligram(s) injectable as needed for anaphylaxis  escitalopram 10 mg oral tablet: 1 tab(s) orally once a day  ipratropium-albuterol 0.5 mg-2.5 mg/3 mLinhalation solution: 3 milliliter(s) inhaled every 6 hours, As Needed  Medrol Dosepak 4 mg oral tablet: 1 packet(s) dosing directions on pack  ProAir HFA 90 mcg/inh inhalation aerosol: 2 puff(s) inhaled 4 times a day  Vitamin D3: 50047 tab(s) orally once a day

## 2021-02-12 ENCOUNTER — TRANSCRIPTION ENCOUNTER (OUTPATIENT)
Age: 56
End: 2021-02-12

## 2021-03-01 ENCOUNTER — NON-APPOINTMENT (OUTPATIENT)
Age: 56
End: 2021-03-01

## 2021-03-01 DIAGNOSIS — J11.1 INFLUENZA DUE TO UNIDENTIFIED INFLUENZA VIRUS WITH OTHER RESPIRATORY MANIFESTATIONS: ICD-10-CM

## 2021-03-01 DIAGNOSIS — Z87.891 PERSONAL HISTORY OF NICOTINE DEPENDENCE: ICD-10-CM

## 2021-03-01 DIAGNOSIS — R09.1 PLEURISY: ICD-10-CM

## 2021-03-01 PROBLEM — Z00.00 ENCOUNTER FOR PREVENTIVE HEALTH EXAMINATION: Status: ACTIVE | Noted: 2021-03-01

## 2021-03-01 RX ORDER — BIMATOPROST 0.1 MG/ML
0.01 SOLUTION/ DROPS OPHTHALMIC
Refills: 0 | Status: ACTIVE | COMMUNITY

## 2021-03-01 RX ORDER — UBIDECARENONE/VIT E ACET 100MG-5
50 MCG CAPSULE ORAL
Refills: 0 | Status: ACTIVE | COMMUNITY

## 2021-03-01 RX ORDER — MONTELUKAST 10 MG/1
10 TABLET, FILM COATED ORAL
Refills: 0 | Status: ACTIVE | COMMUNITY

## 2021-03-12 ENCOUNTER — APPOINTMENT (OUTPATIENT)
Dept: PULMONOLOGY | Facility: CLINIC | Age: 56
End: 2021-03-12

## 2021-05-09 NOTE — ED PROVIDER NOTE - OBJECTIVE STATEMENT
History and Physical      Name:  Garnell Cabot /Age/Sex: 1962  (60 y.o. female)   MRN & CSN:  [id number] & [id number] Admission Date/Time: 2021  1:51 PM   Location:  ED28/ED-28 PCP: Hay Briones Day: 1    Assessment and Plan:   Garnell Cabot is a 60 y.o.  female  who presents with COPD exacerbation. #Chronic COPD in acute exacerbation  #Acute on chronic respiratory failure  #Sepsis criteria secondary to CAP, present on admission  -SIRS 2/4, tachycardia and leukocytosis  -Patient used 2.5 L at baseline, currently needing 3 to 4 L.  -Chest x-ray opacities in bilateral lung bases concerning for pneumonia  -CBC mild leukocytosis  -Protocol  -Ceftriaxone and azithromycin  -Breathing treatment  -Symptomatic management    #Other chronic medical conditions: Resume home meds except for contraindicated  -Schizoaffective disorder  -Depression  -Current everyday smoker. Patient counseled on smoking cessation. Diet No diet orders on file   DVT Prophylaxis [] Lovenox, []  Heparin, [] SCDs, [] Ambulation   GI Prophylaxis [] PPI,  [] H2 Blocker,  [] Carafate,  [] Diet/Tube Feeds   Code Status Prior   Disposition Patient requires continued admission due to COPD exacerbation         History of Present Illness:     Chief Complaint: <principal problem not specified>  Garnell Cabot is a 60 y.o.  female  who presents with a 3-day history of worsening shortness of breath, cough with yellow sputum production as well as subjective fever and chills. Patient also reports generalized weakness and decreased appetite. She denies any chest pain, palpitations, orthopnea, PND or leg swelling. She reports compliance with her medications, denies any sick contacts. She reports taking both doses of her COVID-19 vaccine.        Ten point ROS reviewed negative, unless as noted above    Objective:   No intake or output data in the 24 hours ending 21 1618   Vitals:   Vitals:    21 1356 BP: (!) 126/94   Pulse: 121   Resp: 20   Temp: 98.8 °F (37.1 °C)   SpO2: (!) 86%     Physical Exam:   GEN Awake female, sitting upright in bed in no apparent distress. Appears given age. EYES Pupils are equally round. No scleral erythema, discharge, or conjunctivitis. HENT Mucous membranes are moist. Oral pharynx without exudates, no evidence of thrush. NECK Supple, no apparent thyromegaly or masses. RESP Generalized wheezing on all lung fields  CARDIO/VASC S1/S2 auscultated. Regular rate without appreciable murmurs, rubs, or gallops. No JVD or carotid bruits. Peripheral pulses equal bilaterally and palpable. No peripheral edema. GI Abdomen is soft without significant tenderness, masses, or guarding. Bowel sounds are normoactive. Rectal exam deferred.  No costovertebral angle tenderness. Normal appearing external genitalia. Mckeon catheter is not present. HEME/LYMPH No palpable cervical lymphadenopathy and no hepatosplenomegaly. No petechiae or ecchymoses. MSK No gross joint deformities. SKIN Normal coloration, warm, dry. NEURO Cranial nerves appear grossly intact, normal speech, no lateralizing weakness. PSYCH Awake, alert, oriented x 4. Affect appropriate. Past Medical History:      Past Medical History:   Diagnosis Date    COPD (chronic obstructive pulmonary disease) ([facility] [address] [facility]     Nicotine dependence     Schizo affective schizophrenia ([facility] [address] [facility]      PSHX:  has a past surgical history that includes Tubal ligation. Allergies: Allergies   Allergen Reactions    Metformin And Related     Propranolol     Morphine Nausea And Vomiting       FAM HX: family history includes COPD in her father; Cancer in her daughter; Mental Illness in her father and son; No Known Problems in her brother, mother, sister, and sister.   Soc HX:   Social History     Socioeconomic History    Marital status: Single     Spouse name: None    Number of children: 5    Years of education: 10    Highest education level: None Occupational History    Occupation: disabiltity   Social Needs    Financial resource strain: None    Food insecurity     Worry: None     Inability: None    Transportation needs     Medical: None     Non-medical: None   Tobacco Use    Smoking status: Current Every Day Smoker     Packs/day: 1.00     Years: 41.00     Pack years: 41.00     Types: Cigarettes    Smokeless tobacco: Never Used    Tobacco comment: States she smokes a half a pack a day.    Substance and Sexual Activity    Alcohol use: No    Drug use: No    Sexual activity: Never   Lifestyle    Physical activity     Days per week: None     Minutes per session: None    Stress: None   Relationships    Social connections     Talks on phone: None     Gets together: None     Attends Jehov[employer]'s witness service: None     Active member of club or organization: None     Attends meetings of clubs or organizations: None     Relationship status: None    Intimate partner violence     Fear of current or ex partner: None     Emotionally abused: None     Physically abused: None     Forced sexual activity: None   Other Topics Concern    None   Social History Narrative    Lives with her daughter and son in law       Medications:   Medications:    azithromycin  500 mg Intravenous Once      Infusions:   PRN Meds:        Electronically signed by Raine Weiner MD on 5/9/2021 at 4:18 PM 54F h/o DM Type 2, anaphylaxis to peanuts, CHARO p/w allergic reaction. Pt was exposed to peanuts at 1:30, experienced generalized hives, throat itching, SOB. Took 1 epipen at 3:30 PM with improvement in rash and SOB, no improvement in throat sxs. Denies fever/chills, CP, n/v/d, syncope.

## 2021-06-02 ENCOUNTER — APPOINTMENT (OUTPATIENT)
Age: 56
End: 2021-06-02
Payer: COMMERCIAL

## 2021-06-02 DIAGNOSIS — D86.9 SARCOIDOSIS, UNSPECIFIED: ICD-10-CM

## 2021-06-02 NOTE — PHYSICAL EXAM
[No Cyanosis] : no cyanosis [FROM] : FROM [No Focal Deficits] : no focal deficits [Normal Color/ Pigmentation] : normal color/ pigmentation [Oriented x3] : oriented x3 [Normal Affect] : normal affect

## 2021-10-11 ENCOUNTER — RESULT REVIEW (OUTPATIENT)
Age: 56
End: 2021-10-11

## 2021-10-11 ENCOUNTER — TRANSCRIPTION ENCOUNTER (OUTPATIENT)
Age: 56
End: 2021-10-11

## 2021-12-17 NOTE — PATIENT PROFILE ADULT - NSPROEDAREADYLEARN_GEN_A_NUR
----- Message from Ernesto Ramírez MD sent at 12/17/2021  2:39 PM EST -----  1 tab daily  ----- Message -----  From: Brandt Frankel  Sent: 12/17/2021  12:42 PM EST  To: Ernesto Ramírez MD    Pharmacy needing clarification on potassium, is pt supposed to take this 1 tab daily or 1 tab bid? Please advise.     Olaf Sanders [employer] [phone] acuteness of illness

## 2021-12-24 ENCOUNTER — TRANSCRIPTION ENCOUNTER (OUTPATIENT)
Age: 56
End: 2021-12-24

## 2021-12-24 ENCOUNTER — OUTPATIENT (OUTPATIENT)
Dept: OUTPATIENT SERVICES | Facility: HOSPITAL | Age: 56
LOS: 1 days | Discharge: HOME | End: 2021-12-24
Payer: COMMERCIAL

## 2021-12-24 DIAGNOSIS — M54.50 LOW BACK PAIN, UNSPECIFIED: ICD-10-CM

## 2022-01-12 ENCOUNTER — OUTPATIENT (OUTPATIENT)
Dept: OUTPATIENT SERVICES | Facility: HOSPITAL | Age: 57
LOS: 1 days | Discharge: HOME | End: 2022-01-12
Payer: COMMERCIAL

## 2022-01-12 DIAGNOSIS — R05.9 COUGH, UNSPECIFIED: ICD-10-CM

## 2022-01-12 DIAGNOSIS — J06.9 ACUTE UPPER RESPIRATORY INFECTION, UNSPECIFIED: ICD-10-CM

## 2022-01-15 NOTE — PATIENT PROFILE ADULT - ARE SIGNIFICANT INDICATORS COMPLETE.
its risks and benefits, have been discussed with the Patient along with the risks and benefits of alternatives. Questions were encouraged and answered and the patient and/or representative understands and agrees to proceed.        I discussed with the patient (and/or patient's legal representative) the risks and benefits of the proposed anesthesia plan, General, which may include services performed by other anesthesia providers.    Alternative anesthesia plans, if available, were reviewed with the patient (and/or patient's legal representative). Discussion has been held with the patient (and/or patient's legal representative) regarding risks of anesthesia, which include Intra-operative Awareness and emergent situations that may require change in anesthesia plan.    The patient (and/or patient's legal representative) has indicated understanding, his/her questions have been answered, and he/she wishes to proceed with the planned anesthetic.    Blood Products: Not Anticipated     No Yes

## 2022-02-14 ENCOUNTER — APPOINTMENT (OUTPATIENT)
Age: 57
End: 2022-02-14
Payer: COMMERCIAL

## 2022-02-14 VITALS
DIASTOLIC BLOOD PRESSURE: 84 MMHG | HEIGHT: 62 IN | RESPIRATION RATE: 14 BRPM | HEART RATE: 110 BPM | BODY MASS INDEX: 53.92 KG/M2 | WEIGHT: 293 LBS | OXYGEN SATURATION: 96 % | SYSTOLIC BLOOD PRESSURE: 138 MMHG

## 2022-02-14 RX ORDER — FUROSEMIDE 20 MG/1
20 TABLET ORAL
Refills: 0 | Status: COMPLETED | COMMUNITY
End: 2022-02-14

## 2022-02-14 RX ORDER — ESCITALOPRAM OXALATE 20 MG/1
TABLET ORAL
Refills: 0 | Status: COMPLETED | COMMUNITY
End: 2022-02-14

## 2022-02-14 RX ORDER — ALPRAZOLAM 0.25 MG/1
0.25 TABLET ORAL
Refills: 0 | Status: COMPLETED | COMMUNITY
End: 2022-02-14

## 2022-02-14 RX ORDER — FLUTICASONE PROPIONATE AND SALMETEROL 50; 500 UG/1; UG/1
500-50 POWDER RESPIRATORY (INHALATION) TWICE DAILY
Refills: 0 | Status: COMPLETED | COMMUNITY
End: 2022-02-14

## 2022-02-14 RX ORDER — MULTIVIT-MIN/IRON/FOLIC ACID/K 18-600-40
CAPSULE ORAL
Refills: 0 | Status: COMPLETED | COMMUNITY
End: 2022-02-14

## 2022-02-14 RX ORDER — PNV NO.95/FERROUS FUM/FOLIC AC 28MG-0.8MG
TABLET ORAL
Refills: 0 | Status: COMPLETED | COMMUNITY
End: 2022-02-14

## 2022-02-14 RX ORDER — DICLOFENAC SODIUM 25 MG/1
25 TABLET, DELAYED RELEASE ORAL
Refills: 0 | Status: COMPLETED | COMMUNITY
End: 2022-02-14

## 2022-02-14 RX ORDER — FLUTICASONE FUROATE, UMECLIDINIUM BROMIDE AND VILANTEROL TRIFENATATE 200; 62.5; 25 UG/1; UG/1; UG/1
200-62.5-25 POWDER RESPIRATORY (INHALATION)
Qty: 3 | Refills: 3 | Status: ACTIVE | COMMUNITY
Start: 2022-02-14 | End: 1900-01-01

## 2022-02-14 NOTE — DISCUSSION/SUMMARY
[FreeTextEntry1] : ASTHMATIC BRONCHIITIS/ TRELEGY\par WEIGHT GAIN\par CHARO REPORTS SYMPTOMS, RETITRATION\par WEIGHT LOSS

## 2022-02-14 NOTE — HISTORY OF PRESENT ILLNESS
[TextBox_4] : CHARO COMPLIANT AND BENEFITING, GAINED WEIGHT, TIRED, EDS\par ASTHMATIC BRONCHITIS SP RECENT EXACERBATION

## 2022-05-23 ENCOUNTER — RX RENEWAL (OUTPATIENT)
Age: 57
End: 2022-05-23

## 2022-06-16 ENCOUNTER — RX RENEWAL (OUTPATIENT)
Age: 57
End: 2022-06-16

## 2022-06-16 RX ORDER — ALBUTEROL SULFATE 2.5 MG/3ML
(2.5 MG/3ML) SOLUTION RESPIRATORY (INHALATION) 4 TIMES DAILY
Qty: 450 | Refills: 3 | Status: ACTIVE | COMMUNITY
Start: 2021-06-02 | End: 1900-01-01

## 2022-09-16 ENCOUNTER — RESULT REVIEW (OUTPATIENT)
Age: 57
End: 2022-09-16

## 2022-09-16 ENCOUNTER — OUTPATIENT (OUTPATIENT)
Dept: OUTPATIENT SERVICES | Facility: HOSPITAL | Age: 57
LOS: 1 days | Discharge: HOME | End: 2022-09-16

## 2022-09-16 DIAGNOSIS — R07.9 CHEST PAIN, UNSPECIFIED: ICD-10-CM

## 2022-09-27 ENCOUNTER — NON-APPOINTMENT (OUTPATIENT)
Age: 57
End: 2022-09-27

## 2022-10-04 NOTE — REASON FOR VISIT
[Home] : at home, [unfilled] , at the time of the visit. [Medical Office: ([facility])___] : at the medical office located in  [Follow-Up] : a follow-up visit normal/mobile

## 2022-11-30 ENCOUNTER — APPOINTMENT (OUTPATIENT)
Age: 57
End: 2022-11-30

## 2022-11-30 VITALS
HEIGHT: 62 IN | OXYGEN SATURATION: 96 % | BODY MASS INDEX: 52.08 KG/M2 | HEART RATE: 117 BPM | RESPIRATION RATE: 14 BRPM | WEIGHT: 283 LBS

## 2022-11-30 RX ORDER — FLUTICASONE FUROATE, UMECLIDINIUM BROMIDE AND VILANTEROL TRIFENATATE 100; 62.5; 25 UG/1; UG/1; UG/1
100-62.5-25 POWDER RESPIRATORY (INHALATION)
Qty: 3 | Refills: 3 | Status: ACTIVE | COMMUNITY
Start: 2022-11-30 | End: 1900-01-01

## 2022-11-30 NOTE — REASON FOR VISIT
normal mood with appropriate affect [Follow-Up] : a follow-up visit [Asthma] : asthma [Sleep Apnea] : sleep apnea

## 2022-11-30 NOTE — DISCUSSION/SUMMARY
[FreeTextEntry1] : ASTHMATIC BRONCHIITIS/ TRELEGY\par ALLERGIST EVAL\par WEIGHT GAIN\par CHARO REPORTS SYMPTOMS, RETITRATION\par WEIGHT LOSS

## 2023-01-31 ENCOUNTER — APPOINTMENT (OUTPATIENT)
Age: 58
End: 2023-01-31

## 2023-03-09 ENCOUNTER — OUTPATIENT (OUTPATIENT)
Dept: OUTPATIENT SERVICES | Facility: HOSPITAL | Age: 58
LOS: 1 days | Discharge: HOME | End: 2023-03-09
Payer: COMMERCIAL

## 2023-03-09 ENCOUNTER — APPOINTMENT (OUTPATIENT)
Dept: SLEEP CENTER | Facility: HOSPITAL | Age: 58
End: 2023-03-09

## 2023-03-09 ENCOUNTER — APPOINTMENT (OUTPATIENT)
Dept: SLEEP CENTER | Facility: HOSPITAL | Age: 58
End: 2023-03-09
Payer: COMMERCIAL

## 2023-03-09 DIAGNOSIS — G47.33 OBSTRUCTIVE SLEEP APNEA (ADULT) (PEDIATRIC): ICD-10-CM

## 2023-03-11 DIAGNOSIS — G47.33 OBSTRUCTIVE SLEEP APNEA (ADULT) (PEDIATRIC): ICD-10-CM

## 2023-03-29 ENCOUNTER — APPOINTMENT (OUTPATIENT)
Dept: CARDIOLOGY | Facility: CLINIC | Age: 58
End: 2023-03-29

## 2023-04-12 ENCOUNTER — APPOINTMENT (OUTPATIENT)
Dept: PULMONOLOGY | Facility: CLINIC | Age: 58
End: 2023-04-12
Payer: COMMERCIAL

## 2023-04-12 VITALS
BODY MASS INDEX: 52.26 KG/M2 | WEIGHT: 284 LBS | OXYGEN SATURATION: 98 % | HEART RATE: 110 BPM | RESPIRATION RATE: 14 BRPM | SYSTOLIC BLOOD PRESSURE: 130 MMHG | DIASTOLIC BLOOD PRESSURE: 80 MMHG | HEIGHT: 62 IN

## 2023-04-12 DIAGNOSIS — R06.02 SHORTNESS OF BREATH: ICD-10-CM

## 2023-04-12 RX ORDER — FLUTICASONE FUROATE, UMECLIDINIUM BROMIDE AND VILANTEROL TRIFENATATE 100; 62.5; 25 UG/1; UG/1; UG/1
100-62.5-25 POWDER RESPIRATORY (INHALATION)
Qty: 180 | Refills: 3 | Status: ACTIVE | COMMUNITY
Start: 2023-04-12 | End: 1900-01-01

## 2023-04-12 RX ORDER — ALBUTEROL SULFATE 90 UG/1
108 (90 BASE) INHALANT RESPIRATORY (INHALATION)
Qty: 3 | Refills: 3 | Status: ACTIVE | COMMUNITY
Start: 2023-04-12 | End: 1900-01-01

## 2023-04-12 NOTE — DISCUSSION/SUMMARY
ONT COLOR=\"#[id number]\">LOLITA ARMIJO  : 1928 13:47:53  ACCOUNT:  [id number]  HOME PHONE:  [phone]  WORK PHONE:       Per MVD review of carotid US:    Mild, No change, CPM    Left patient a voicemail advising her of results and to CPM. Blaire Irwin RN     [FreeTextEntry1] : ASTHMATIC STABLE\par SOB ON EXERTION\par WEIGHT GAIN\par D DIMER\par

## 2023-04-12 NOTE — HISTORY OF PRESENT ILLNESS
[TextBox_4] : ASTHMA DOING  WELL, WORSE WITH WITH ALLERGIES\par CHARO COMPLIANT AND BENEFITING\par SOB ON EXERTION

## 2023-04-14 ENCOUNTER — LABORATORY RESULT (OUTPATIENT)
Age: 58
End: 2023-04-14

## 2023-04-21 ENCOUNTER — APPOINTMENT (OUTPATIENT)
Dept: CARDIOLOGY | Facility: CLINIC | Age: 58
End: 2023-04-21
Payer: COMMERCIAL

## 2023-04-27 ENCOUNTER — APPOINTMENT (OUTPATIENT)
Dept: CARDIOLOGY | Facility: CLINIC | Age: 58
End: 2023-04-27
Payer: COMMERCIAL

## 2023-04-27 VITALS
SYSTOLIC BLOOD PRESSURE: 136 MMHG | HEIGHT: 62 IN | OXYGEN SATURATION: 96 % | HEART RATE: 99 BPM | BODY MASS INDEX: 50.79 KG/M2 | DIASTOLIC BLOOD PRESSURE: 84 MMHG | WEIGHT: 276 LBS

## 2023-04-27 DIAGNOSIS — Z82.49 FAMILY HISTORY OF ISCHEMIC HEART DISEASE AND OTHER DISEASES OF THE CIRCULATORY SYSTEM: ICD-10-CM

## 2023-04-27 DIAGNOSIS — I73.9 PERIPHERAL VASCULAR DISEASE, UNSPECIFIED: ICD-10-CM

## 2023-04-27 DIAGNOSIS — Z86.59 PERSONAL HISTORY OF OTHER MENTAL AND BEHAVIORAL DISORDERS: ICD-10-CM

## 2023-04-27 RX ORDER — METFORMIN HYDROCHLORIDE 500 MG/1
500 TABLET, COATED ORAL
Qty: 90 | Refills: 3 | Status: ACTIVE | COMMUNITY
Start: 2023-04-27

## 2023-04-27 RX ORDER — HYDROCHLOROTHIAZIDE 25 MG/1
25 TABLET ORAL DAILY
Qty: 90 | Refills: 2 | Status: ACTIVE | COMMUNITY
Start: 2023-04-27

## 2023-04-27 RX ORDER — ATORVASTATIN CALCIUM 10 MG/1
10 TABLET, FILM COATED ORAL
Qty: 30 | Refills: 2 | Status: ACTIVE | COMMUNITY
Start: 2023-04-27

## 2023-04-28 NOTE — ASSESSMENT
[FreeTextEntry1] : Assessment:\par #Leg pain with edema\par - Worse as day progresses\par - LE ART US 4/2023 with mild athero bilaterally\par - 4/21/23 R TBI 0.87, L TBI 0.84 (Normal b/l)\par - Echocardiogram 4/5/23 with Dr. Roberts showed LVEF 63%, mild LVH, G1DD\par - Cr 0.87\par #T2DM, HTN, DLD\par #BMI 50\par \par Plan:\par - Initiate conservative therapies for edema:\par     -Patient will go to a surgical supply store and get fitted for a knee high 10-20 mmHg compression stocking (velcro or zipper)\par     -Compression should be donned in the morning and doffed in the evening\par     -Apply a non-petrolatum based moisturizer before bedtime\par -  Venous duplex to rule out DVT\par -  Consider cessation of medications that may exacerbate edema\par     -Amlodipine, Gabapentin/Pregabalin, NSAIDS, Estrogen, Steroids, Pioglitazone/Rosiglitazone\par -  Weight loss, diet, leg elevation -- referral to dietician \par -  If remains symptomatic despite conservative measures, will proceed with reflux study and evaluation for more advanced venous ablative techniques (i.e.,  ablation, phlebectomy, sclerotherapy). \par -  Return in 3 months\par \par \par I, Dr. Shaw, personally performed the evaluation and management (E/M) services for this new patient. That E/M includes conducting the clinically appropriate initial history &/or exam, assessing all conditions, and establishing the plan of care. Today, SHERMAN Guevara was here to observe &/or participate in the visit & follow plan of care established by me.\par

## 2023-04-28 NOTE — PHYSICAL EXAM
[Well Developed] : well developed [Well Nourished] : well nourished [No Acute Distress] : no acute distress [Normal Conjunctiva] : normal conjunctiva [No Carotid Bruit] : no carotid bruit [Normal S1, S2] : normal S1, S2 [No Murmur] : no murmur [No Rub] : no rub [No Gallop] : no gallop [Clear Lung Fields] : clear lung fields [Good Air Entry] : good air entry [No Respiratory Distress] : no respiratory distress  [Soft] : abdomen soft [Non Tender] : non-tender [No Masses/organomegaly] : no masses/organomegaly [Normal Bowel Sounds] : normal bowel sounds [Moves all extremities] : moves all extremities [No Focal Deficits] : no focal deficits [Normal Speech] : normal speech [No ulcers] : no ulcers [No edema] : no edema [No varicosities] : no varicosities [No chronic venous stasis changes] : no chronic venous stasis changes [No cyanosis] : no cyanosis [No rashes] : no rashes [Present] : present bilaterally

## 2023-04-28 NOTE — HISTORY OF PRESENT ILLNESS
[FreeTextEntry1] : Pt is 57 year old Female with PMH of Asthma, Sarcoidosis, Spinal stenosis, Obesity, DM, HL, HTN, CHARO.  Pt had 3 miscarriages and 2 full term pregnancies that resulted in preeclampsia.  Here for vascular evaluation of leg pain. \par \par Referring: Podiatrist Dr Bui \par \par 23\par Pt is here to establish care due to BLEs pain Starting from the Upper Leg radiating to B feet.  Pt reports sharp pain and aching that occurs with rest and with activity.   Pt  can walk 1 block and has to stop due to SOB, BLEs weakness. Reports improvement when she applies ice.  She has a cardiologist. \par Pts mother  at age 68  of MI.  Pts grandmother  of MI at age 70 \par \par 23 Chol 148 Trig 87 LDL 85 \par 23 US Duplex Arterial BLE Mild Atherosclerosis in BLEs \par

## 2023-05-31 ENCOUNTER — APPOINTMENT (OUTPATIENT)
Dept: CARDIOLOGY | Facility: CLINIC | Age: 58
End: 2023-05-31
Payer: COMMERCIAL

## 2023-07-25 ENCOUNTER — TRANSCRIPTION ENCOUNTER (OUTPATIENT)
Age: 58
End: 2023-07-25

## 2023-07-25 ENCOUNTER — OUTPATIENT (OUTPATIENT)
Dept: INPATIENT UNIT | Facility: HOSPITAL | Age: 58
LOS: 1 days | Discharge: HOME | End: 2023-07-25
Payer: COMMERCIAL

## 2023-07-25 VITALS
SYSTOLIC BLOOD PRESSURE: 122 MMHG | TEMPERATURE: 98 F | DIASTOLIC BLOOD PRESSURE: 73 MMHG | HEART RATE: 93 BPM | HEIGHT: 62 IN | WEIGHT: 274.92 LBS | RESPIRATION RATE: 18 BRPM

## 2023-07-25 VITALS
HEART RATE: 90 BPM | DIASTOLIC BLOOD PRESSURE: 72 MMHG | RESPIRATION RATE: 18 BRPM | SYSTOLIC BLOOD PRESSURE: 118 MMHG | OXYGEN SATURATION: 99 %

## 2023-07-25 DIAGNOSIS — E73.9 LACTOSE INTOLERANCE, UNSPECIFIED: ICD-10-CM

## 2023-07-25 DIAGNOSIS — Z98.51 TUBAL LIGATION STATUS: Chronic | ICD-10-CM

## 2023-07-25 DIAGNOSIS — K21.9 GASTRO-ESOPHAGEAL REFLUX DISEASE WITHOUT ESOPHAGITIS: ICD-10-CM

## 2023-07-25 DIAGNOSIS — Z90.49 ACQUIRED ABSENCE OF OTHER SPECIFIED PARTS OF DIGESTIVE TRACT: Chronic | ICD-10-CM

## 2023-07-25 RX ORDER — ALBUTEROL 90 UG/1
2 AEROSOL, METERED ORAL
Refills: 0 | DISCHARGE

## 2023-07-25 RX ORDER — FLUTICASONE FUROATE, UMECLIDINIUM BROMIDE AND VILANTEROL TRIFENATATE 200; 62.5; 25 UG/1; UG/1; UG/1
1 POWDER RESPIRATORY (INHALATION)
Refills: 0 | DISCHARGE

## 2023-07-25 RX ORDER — ESCITALOPRAM OXALATE 10 MG/1
1 TABLET, FILM COATED ORAL
Qty: 0 | Refills: 0 | DISCHARGE

## 2023-07-25 RX ORDER — ESCITALOPRAM OXALATE 10 MG/1
1 TABLET, FILM COATED ORAL
Refills: 0 | DISCHARGE

## 2023-07-25 RX ORDER — ATORVASTATIN CALCIUM 80 MG/1
1 TABLET, FILM COATED ORAL
Refills: 0 | DISCHARGE

## 2023-07-25 RX ORDER — ALBUTEROL 90 UG/1
2 AEROSOL, METERED ORAL
Qty: 0 | Refills: 0 | DISCHARGE

## 2023-07-25 RX ORDER — METFORMIN HYDROCHLORIDE 850 MG/1
1 TABLET ORAL
Refills: 0 | DISCHARGE

## 2023-07-25 RX ORDER — CHOLECALCIFEROL (VITAMIN D3) 125 MCG
50000 CAPSULE ORAL
Qty: 0 | Refills: 0 | DISCHARGE

## 2023-07-25 RX ORDER — FLUTICASONE PROPIONATE AND SALMETEROL 50; 250 UG/1; UG/1
2 POWDER ORAL; RESPIRATORY (INHALATION)
Qty: 0 | Refills: 0 | DISCHARGE

## 2023-07-25 RX ORDER — ALPRAZOLAM 0.25 MG
1 TABLET ORAL
Qty: 0 | Refills: 0 | DISCHARGE

## 2023-07-25 RX ORDER — HYDROCHLOROTHIAZIDE 25 MG
1 TABLET ORAL
Refills: 0 | DISCHARGE

## 2023-07-25 NOTE — ASU PATIENT PROFILE, ADULT - NSICDXPASTMEDICALHX_GEN_ALL_CORE_FT
PAST MEDICAL HISTORY:  Asthma     Prediabetes     Sarcoidosis      PAST MEDICAL HISTORY:  Asthma     HTN (hypertension)     Prediabetes     Sarcoidosis

## 2023-07-25 NOTE — ASU DISCHARGE PLAN (ADULT/PEDIATRIC) - CARE PROVIDER_API CALL
Elizabeth Coleman  Gastroenterology  [address] [place] [address]  Phone: [phone]  Fax: [phone]  Follow Up Time:

## 2023-07-25 NOTE — CHART NOTE - NSCHARTNOTEFT_GEN_A_CORE
PACU ANESTHESIA ADMISSION NOTE      Procedure: EGD with Bx  Post op diagnosis:      ____  Intubated  TV:______       Rate: ______      FiO2: ______    _x___  Patent Airway    _x___  Full return of protective reflexes    _x___  Full recovery from anesthesia / back to baseline status    Vitals:            T:    98.1            BP : 118/61               R:  18            Sat:   97            P: 92      Mental Status:  _x___ Awake   _____ Alert   _____ Drowsy   _____ Sedated    Nausea/Vomiting:  _x___  NO       ______Yes,   See Post - Op Orders         Pain Scale (0-10):  __0___    Treatment: _x___ None    ____ See Post - Op/PCA Orders    Post - Operative Fluids:   __x__ Oral   ____ See Post - Op Orders    Plan: Discharge:   _x___Home       _____Floor     _____Critical Care    _____  Other:_________________    Comments:  No anesthesia issues or complications noted.  Discharge when criteria met.

## 2023-07-26 LAB — SURGICAL PATHOLOGY STUDY: SIGNIFICANT CHANGE UP

## 2023-07-27 ENCOUNTER — APPOINTMENT (OUTPATIENT)
Dept: CARDIOLOGY | Facility: CLINIC | Age: 58
End: 2023-07-27
Payer: COMMERCIAL

## 2023-07-27 VITALS
WEIGHT: 273 LBS | SYSTOLIC BLOOD PRESSURE: 126 MMHG | HEART RATE: 78 BPM | DIASTOLIC BLOOD PRESSURE: 72 MMHG | HEIGHT: 62 IN | BODY MASS INDEX: 50.24 KG/M2 | OXYGEN SATURATION: 99 %

## 2023-07-27 DIAGNOSIS — M79.606 PAIN IN LEG, UNSPECIFIED: ICD-10-CM

## 2023-07-27 DIAGNOSIS — E11.9 TYPE 2 DIABETES MELLITUS W/OUT COMPLICATIONS: ICD-10-CM

## 2023-07-27 DIAGNOSIS — E66.01 MORBID (SEVERE) OBESITY DUE TO EXCESS CALORIES: ICD-10-CM

## 2023-07-27 DIAGNOSIS — I10 ESSENTIAL (PRIMARY) HYPERTENSION: ICD-10-CM

## 2023-07-27 DIAGNOSIS — E78.5 HYPERLIPIDEMIA, UNSPECIFIED: ICD-10-CM

## 2023-07-27 DIAGNOSIS — I87.2 VENOUS INSUFFICIENCY (CHRONIC) (PERIPHERAL): ICD-10-CM

## 2023-07-27 RX ORDER — DOXYCYCLINE HYCLATE 100 MG/1
100 CAPSULE ORAL TWICE DAILY
Qty: 20 | Refills: 0 | Status: DISCONTINUED | COMMUNITY
Start: 2021-06-02 | End: 2023-07-27

## 2023-07-27 RX ORDER — FLUTICASONE FUROATE, UMECLIDINIUM BROMIDE AND VILANTEROL TRIFENATATE 100; 62.5; 25 UG/1; UG/1; UG/1
100-62.5-25 POWDER RESPIRATORY (INHALATION) DAILY
Qty: 1 | Refills: 3 | Status: DISCONTINUED | COMMUNITY
Start: 2021-05-27 | End: 2023-07-27

## 2023-07-27 RX ORDER — ALBUTEROL SULFATE 90 UG/1
108 (90 BASE) INHALANT RESPIRATORY (INHALATION)
Refills: 0 | Status: DISCONTINUED | COMMUNITY
End: 2023-07-27

## 2023-07-27 RX ORDER — ALBUTEROL SULFATE 90 UG/1
108 (90 BASE) INHALANT RESPIRATORY (INHALATION)
Qty: 3 | Refills: 3 | Status: DISCONTINUED | COMMUNITY
Start: 2022-11-30 | End: 2023-07-27

## 2023-07-27 RX ORDER — MONTELUKAST 10 MG/1
10 TABLET, FILM COATED ORAL
Qty: 30 | Refills: 6 | Status: DISCONTINUED | COMMUNITY
Start: 2022-02-14 | End: 2023-07-27

## 2023-07-27 RX ORDER — SEMAGLUTIDE 0.68 MG/ML
2 INJECTION, SOLUTION SUBCUTANEOUS
Refills: 0 | Status: DISCONTINUED | COMMUNITY
Start: 2023-04-27 | End: 2023-07-27

## 2023-07-27 RX ORDER — FLUTICASONE FUROATE, UMECLIDINIUM BROMIDE AND VILANTEROL TRIFENATATE 200; 62.5; 25 UG/1; UG/1; UG/1
200-62.5-25 POWDER RESPIRATORY (INHALATION) DAILY
Refills: 0 | Status: DISCONTINUED | COMMUNITY
End: 2023-07-27

## 2023-07-27 RX ORDER — PREDNISONE 20 MG/1
20 TABLET ORAL
Qty: 15 | Refills: 0 | Status: DISCONTINUED | COMMUNITY
Start: 2021-06-02 | End: 2023-07-27

## 2023-07-27 RX ORDER — OMEPRAZOLE 40 MG/1
40 CAPSULE, DELAYED RELEASE ORAL DAILY
Refills: 0 | Status: ACTIVE | COMMUNITY

## 2023-07-27 RX ORDER — TIZANIDINE 4 MG/1
TABLET ORAL
Refills: 0 | Status: DISCONTINUED | COMMUNITY
End: 2023-07-27

## 2023-07-27 RX ORDER — MONTELUKAST 10 MG/1
10 TABLET, FILM COATED ORAL
Qty: 90 | Refills: 3 | Status: DISCONTINUED | COMMUNITY
Start: 2022-11-30 | End: 2023-07-27

## 2023-07-27 RX ORDER — FLUTICASONE FUROATE, UMECLIDINIUM BROMIDE AND VILANTEROL TRIFENATATE 100; 62.5; 25 UG/1; UG/1; UG/1
100-62.5-25 POWDER RESPIRATORY (INHALATION) DAILY
Qty: 3 | Refills: 3 | Status: DISCONTINUED | COMMUNITY
Start: 2021-06-02 | End: 2023-07-27

## 2023-07-27 RX ORDER — PREDNISONE 20 MG/1
20 TABLET ORAL
Refills: 0 | Status: DISCONTINUED | COMMUNITY
End: 2023-07-27

## 2023-07-27 RX ORDER — ALBUTEROL SULFATE 2.5 MG/3ML
(2.5 MG/3ML) SOLUTION RESPIRATORY (INHALATION)
Refills: 0 | Status: DISCONTINUED | COMMUNITY
End: 2023-07-27

## 2023-07-27 NOTE — PHYSICAL EXAM
[Well Developed] : well developed [Well Nourished] : well nourished [No Acute Distress] : no acute distress [Normal Conjunctiva] : normal conjunctiva [No Carotid Bruit] : no carotid bruit [Normal S1, S2] : normal S1, S2 [No Murmur] : no murmur [No Rub] : no rub [No Gallop] : no gallop [Good Air Entry] : good air entry [Clear Lung Fields] : clear lung fields [No Respiratory Distress] : no respiratory distress  [Soft] : abdomen soft [Non Tender] : non-tender [No Masses/organomegaly] : no masses/organomegaly [Normal Bowel Sounds] : normal bowel sounds [Moves all extremities] : moves all extremities [No Focal Deficits] : no focal deficits [Normal Speech] : normal speech [No ulcers] : no ulcers [No varicosities] : no varicosities [No chronic venous stasis changes] : no chronic venous stasis changes [No cyanosis] : no cyanosis [No rashes] : no rashes [Present] : present bilaterally [Trace] : Left: trace [de-identified] : bilateral ankles

## 2023-07-27 NOTE — REVIEW OF SYSTEMS
[Dyspnea on exertion] : dyspnea during exertion [Negative] : Heme/Lymph [Chest Discomfort] : no chest discomfort [Lower Ext Edema] : lower extremity edema [Palpitations] : no palpitations [Orthopnea] : no orthopnea [PND] : no PND

## 2023-07-27 NOTE — ASSESSMENT
[FreeTextEntry1] : Assessment:\par #Leg pain with edema\par - Worse as day progresses\par - LE ART US 4/2023 with mild athero bilaterally\par - 4/21/23 R TBI 0.87, L TBI 0.84 (Normal b/l)\par - LE venous US 5/31/23 negative for DVT bilaterally \par - Echocardiogram 4/5/23 with Dr. Roberts showed LVEF 63%, mild LVH, G1DD\par - Cr 0.87\par #T2DM, HTN, DLD\par #BMI 50\par #Active tobacco use\par \par Plan:\par - Encouraged compliance with compression \par -  Weight loss, diet, leg elevation -- referral to dietician \par -  Counseled patient on exercise\par - Smoking cessation counseling\par - Follow-up with Dr. Roberts for cardiac care and Dr. Bui for podiatric care\par -  Return in 4 months\par \par \par I, Dr. Shaw, personally performed the evaluation and management (E/M) services for this established patient who presents today with (a) new problem(s)/exacerbation of (an) existing condition(s). That E/M includes conducting the clinically appropriate interval history &/or exam, assessing all new/exacerbated conditions, and establishing a new plan of care. Today, SHERMAN Guevara was here to observe &/or participate in the visit & follow plan of care established by me. \par \par \par \par \par

## 2023-07-27 NOTE — HISTORY OF PRESENT ILLNESS
[FreeTextEntry1] : 58 F with PMH of Asthma, Sarcoidosis, Spinal stenosis, Obesity, DM, HL, HTN, CHARO.  Pt had 3 miscarriages and 2 full term pregnancies that resulted in preeclampsia.  Here for follow-up of leg pain. \par \par Referring: Podiatrist Dr Bui \par \par 23 \par Continues  to have RLE weakness after standing too long.  Pt bought compression stocking and wears them at home mostly.   Pt can walk 2 blocks.  Denies chest pain, c/o SOB with exertion.  Pt doesn’t elevate her legs.  Pt did not have dietitian f.u yet.  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 \par \par Venous Duplex -- no DVT \par  \par 23\par Pt had 3 miscarriages and 2 full term pregnancies that resulted in preeclampsia.  Here for vascular evaluation of leg pain. \par \par Pt is here to establish care due to BLEs pain Starting from the Upper Leg radiating to B feet.  Pt reports sharp pain and aching that occurs with rest and with activity.   Pt  can walk 1 block and has to stop due to SOB, BLEs weakness. Reports improvement when she applies ice.  She has a cardiologist. \par Pts mother  at age 68  of MI.  Pts grandmother  of MI at age 70 \par \par 23 Chol 148 Trig 87 LDL 85 \par 23 US Duplex Arterial BLE Mild Atherosclerosis in BLEs \par

## 2023-07-28 DIAGNOSIS — R73.03 PREDIABETES: ICD-10-CM

## 2023-07-28 DIAGNOSIS — R10.13 EPIGASTRIC PAIN: ICD-10-CM

## 2023-07-28 DIAGNOSIS — Z91.018 ALLERGY TO OTHER FOODS: ICD-10-CM

## 2023-07-28 DIAGNOSIS — Z91.010 ALLERGY TO PEANUTS: ICD-10-CM

## 2023-07-28 DIAGNOSIS — K29.50 UNSPECIFIED CHRONIC GASTRITIS WITHOUT BLEEDING: ICD-10-CM

## 2023-07-28 DIAGNOSIS — Z88.0 ALLERGY STATUS TO PENICILLIN: ICD-10-CM

## 2023-07-28 DIAGNOSIS — J45.909 UNSPECIFIED ASTHMA, UNCOMPLICATED: ICD-10-CM

## 2023-07-28 DIAGNOSIS — D86.9 SARCOIDOSIS, UNSPECIFIED: ICD-10-CM

## 2023-07-28 DIAGNOSIS — K44.9 DIAPHRAGMATIC HERNIA WITHOUT OBSTRUCTION OR GANGRENE: ICD-10-CM

## 2023-10-18 ENCOUNTER — APPOINTMENT (OUTPATIENT)
Dept: PULMONOLOGY | Facility: CLINIC | Age: 58
End: 2023-10-18
Payer: COMMERCIAL

## 2023-10-18 VITALS
DIASTOLIC BLOOD PRESSURE: 80 MMHG | BODY MASS INDEX: 47.11 KG/M2 | OXYGEN SATURATION: 98 % | HEIGHT: 62 IN | HEART RATE: 127 BPM | SYSTOLIC BLOOD PRESSURE: 132 MMHG | WEIGHT: 256 LBS

## 2023-10-18 DIAGNOSIS — G47.33 OBSTRUCTIVE SLEEP APNEA (ADULT) (PEDIATRIC): ICD-10-CM

## 2023-10-18 DIAGNOSIS — J44.9 CHRONIC OBSTRUCTIVE PULMONARY DISEASE, UNSPECIFIED: ICD-10-CM

## 2023-10-18 DIAGNOSIS — J45.909 UNSPECIFIED ASTHMA, UNCOMPLICATED: ICD-10-CM

## 2023-11-13 ENCOUNTER — APPOINTMENT (OUTPATIENT)
Dept: CARDIOTHORACIC SURGERY | Facility: CLINIC | Age: 58
End: 2023-11-13
Payer: COMMERCIAL

## 2023-11-13 VITALS — WEIGHT: 256 LBS | BODY MASS INDEX: 47.11 KG/M2 | HEIGHT: 62 IN

## 2023-11-13 DIAGNOSIS — F17.210 NICOTINE DEPENDENCE, CIGARETTES, UNCOMPLICATED: ICD-10-CM

## 2024-01-04 ENCOUNTER — APPOINTMENT (OUTPATIENT)
Dept: CARDIOLOGY | Facility: CLINIC | Age: 59
End: 2024-01-04

## 2024-01-04 ENCOUNTER — RX RENEWAL (OUTPATIENT)
Age: 59
End: 2024-01-04

## 2024-01-04 RX ORDER — MONTELUKAST 10 MG/1
10 TABLET, FILM COATED ORAL
Qty: 90 | Refills: 3 | Status: ACTIVE | COMMUNITY
Start: 2021-10-07 | End: 1900-01-01

## 2024-01-15 NOTE — DISCHARGE NOTE NURSING/CASE MANAGEMENT/SOCIAL WORK - HAS THE PATIENT USED TOBACCO IN THE PAST 30 DAYS?
Sharon called for a refill of tramadol.    Advised patient we haven't prescribed that since September 2023 and Dr. Ram has been prescribing it for her. She'll have to call Dr. Ram office.    Patient understands.   Yes

## 2024-02-22 DIAGNOSIS — E11.65 TYPE 2 DIABETES MELLITUS WITH HYPERGLYCEMIA: ICD-10-CM

## 2024-02-22 RX ORDER — TIRZEPATIDE 12.5 MG/.5ML
12.5 INJECTION, SOLUTION SUBCUTANEOUS
Qty: 3 | Refills: 3 | Status: ACTIVE | COMMUNITY
Start: 1900-01-01 | End: 1900-01-01

## 2024-04-09 ENCOUNTER — APPOINTMENT (OUTPATIENT)
Dept: PULMONOLOGY | Facility: CLINIC | Age: 59
End: 2024-04-09

## 2024-10-03 RX ORDER — TIRZEPATIDE 15 MG/.5ML
15 INJECTION, SOLUTION SUBCUTANEOUS WEEKLY
Qty: 2 | Refills: 11 | Status: ACTIVE | COMMUNITY
Start: 2024-10-03 | End: 1900-01-01

## 2024-12-05 ENCOUNTER — OUTPATIENT (OUTPATIENT)
Dept: OUTPATIENT SERVICES | Facility: HOSPITAL | Age: 59
LOS: 1 days | End: 2024-12-05
Payer: COMMERCIAL

## 2024-12-05 DIAGNOSIS — Z98.51 TUBAL LIGATION STATUS: Chronic | ICD-10-CM

## 2024-12-05 DIAGNOSIS — M79.643 PAIN IN UNSPECIFIED HAND: ICD-10-CM

## 2024-12-05 DIAGNOSIS — Z90.49 ACQUIRED ABSENCE OF OTHER SPECIFIED PARTS OF DIGESTIVE TRACT: Chronic | ICD-10-CM

## 2024-12-06 DIAGNOSIS — M79.643 PAIN IN UNSPECIFIED HAND: ICD-10-CM

## 2024-12-30 ENCOUNTER — RX RENEWAL (OUTPATIENT)
Age: 59
End: 2024-12-30

## 2025-04-23 ENCOUNTER — APPOINTMENT (OUTPATIENT)
Dept: PULMONOLOGY | Facility: CLINIC | Age: 60
End: 2025-04-23
Payer: COMMERCIAL

## 2025-04-23 ENCOUNTER — APPOINTMENT (OUTPATIENT)
Dept: PULMONOLOGY | Facility: CLINIC | Age: 60
End: 2025-04-23

## 2025-04-23 VITALS
OXYGEN SATURATION: 97 % | HEIGHT: 62 IN | SYSTOLIC BLOOD PRESSURE: 102 MMHG | DIASTOLIC BLOOD PRESSURE: 70 MMHG | WEIGHT: 241 LBS | RESPIRATION RATE: 14 BRPM | HEART RATE: 105 BPM | BODY MASS INDEX: 44.35 KG/M2

## 2025-04-23 DIAGNOSIS — J44.9 CHRONIC OBSTRUCTIVE PULMONARY DISEASE, UNSPECIFIED: ICD-10-CM

## 2025-04-23 DIAGNOSIS — G47.33 OBSTRUCTIVE SLEEP APNEA (ADULT) (PEDIATRIC): ICD-10-CM

## 2025-04-23 RX ORDER — ALBUTEROL SULFATE 90 UG/1
108 (90 BASE) AEROSOL, METERED RESPIRATORY (INHALATION) EVERY 4 HOURS
Qty: 30 | Refills: 3 | Status: ACTIVE | COMMUNITY
Start: 2025-04-23 | End: 1900-01-01

## 2025-04-23 RX ORDER — FLUTICASONE FUROATE, UMECLIDINIUM BROMIDE AND VILANTEROL TRIFENATATE 100; 62.5; 25 UG/1; UG/1; UG/1
100-62.5-25 POWDER RESPIRATORY (INHALATION)
Qty: 180 | Refills: 3 | Status: ACTIVE | COMMUNITY
Start: 2025-04-23 | End: 1900-01-01

## 2025-04-23 RX ORDER — MONTELUKAST 10 MG/1
10 TABLET, FILM COATED ORAL
Qty: 90 | Refills: 3 | Status: ACTIVE | COMMUNITY
Start: 2025-04-23 | End: 1900-01-01

## 2025-06-05 ENCOUNTER — RESULT REVIEW (OUTPATIENT)
Age: 60
End: 2025-06-05

## 2025-06-05 ENCOUNTER — OUTPATIENT (OUTPATIENT)
Dept: OUTPATIENT SERVICES | Facility: HOSPITAL | Age: 60
LOS: 1 days | End: 2025-06-05
Payer: COMMERCIAL

## 2025-06-05 DIAGNOSIS — Z98.51 TUBAL LIGATION STATUS: Chronic | ICD-10-CM

## 2025-06-05 DIAGNOSIS — Z90.49 ACQUIRED ABSENCE OF OTHER SPECIFIED PARTS OF DIGESTIVE TRACT: Chronic | ICD-10-CM

## 2025-06-05 DIAGNOSIS — J44.9 CHRONIC OBSTRUCTIVE PULMONARY DISEASE, UNSPECIFIED: ICD-10-CM

## 2025-06-05 DIAGNOSIS — G47.33 OBSTRUCTIVE SLEEP APNEA (ADULT) (PEDIATRIC): ICD-10-CM

## 2025-06-05 DIAGNOSIS — Z00.8 ENCOUNTER FOR OTHER GENERAL EXAMINATION: ICD-10-CM

## 2025-06-05 PROCEDURE — 71271 CT THORAX LUNG CANCER SCR C-: CPT

## 2025-06-05 PROCEDURE — 71271 CT THORAX LUNG CANCER SCR C-: CPT | Mod: 26

## 2025-06-06 DIAGNOSIS — G47.33 OBSTRUCTIVE SLEEP APNEA (ADULT) (PEDIATRIC): ICD-10-CM

## 2025-06-06 DIAGNOSIS — J44.9 CHRONIC OBSTRUCTIVE PULMONARY DISEASE, UNSPECIFIED: ICD-10-CM

## 2025-06-10 ENCOUNTER — NON-APPOINTMENT (OUTPATIENT)
Age: 60
End: 2025-06-10

## 2025-06-17 ENCOUNTER — APPOINTMENT (OUTPATIENT)
Age: 60
End: 2025-06-17

## 2025-06-20 PROBLEM — J45.909 UNSPECIFIED ASTHMA, UNCOMPLICATED: Chronic | Status: ACTIVE | Noted: 2019-10-25

## 2025-06-20 PROBLEM — D86.9 SARCOIDOSIS, UNSPECIFIED: Chronic | Status: ACTIVE | Noted: 2019-10-25

## 2025-06-20 PROBLEM — I10 ESSENTIAL (PRIMARY) HYPERTENSION: Chronic | Status: ACTIVE | Noted: 2023-07-25
